# Patient Record
Sex: FEMALE | Race: BLACK OR AFRICAN AMERICAN | Employment: FULL TIME | URBAN - METROPOLITAN AREA
[De-identification: names, ages, dates, MRNs, and addresses within clinical notes are randomized per-mention and may not be internally consistent; named-entity substitution may affect disease eponyms.]

---

## 2017-08-16 ENCOUNTER — HOSPITAL ENCOUNTER (EMERGENCY)
Age: 32
Discharge: HOME | End: 2017-08-16
Attending: EMERGENCY MEDICINE
Payer: COMMERCIAL

## 2017-08-16 VITALS
DIASTOLIC BLOOD PRESSURE: 71 MMHG | RESPIRATION RATE: 18 BRPM | OXYGEN SATURATION: 100 % | SYSTOLIC BLOOD PRESSURE: 138 MMHG | HEART RATE: 96 BPM | WEIGHT: 220 LBS | HEIGHT: 62 IN | TEMPERATURE: 97.8 F | BODY MASS INDEX: 40.48 KG/M2

## 2017-08-16 DIAGNOSIS — L02.31 ABSCESS, GLUTEAL CLEFT: ICD-10-CM

## 2017-08-16 DIAGNOSIS — L05.01 PILONIDAL ABSCESS: Primary | ICD-10-CM

## 2017-08-16 PROCEDURE — 74011250637 HC RX REV CODE- 250/637: Performed by: EMERGENCY MEDICINE

## 2017-08-16 PROCEDURE — 99283 EMERGENCY DEPT VISIT LOW MDM: CPT | Performed by: EMERGENCY MEDICINE

## 2017-08-16 PROCEDURE — 96372 THER/PROPH/DIAG INJ SC/IM: CPT | Performed by: EMERGENCY MEDICINE

## 2017-08-16 PROCEDURE — 87205 SMEAR GRAM STAIN: CPT | Performed by: EMERGENCY MEDICINE

## 2017-08-16 PROCEDURE — 77030019895 HC PCKNG STRP IODO -A

## 2017-08-16 PROCEDURE — 75810000289 HC I&D ABSCESS SIMP/COMP/MULT: Performed by: EMERGENCY MEDICINE

## 2017-08-16 PROCEDURE — 74011250636 HC RX REV CODE- 250/636: Performed by: EMERGENCY MEDICINE

## 2017-08-16 PROCEDURE — 87077 CULTURE AEROBIC IDENTIFY: CPT | Performed by: EMERGENCY MEDICINE

## 2017-08-16 PROCEDURE — 87186 SC STD MICRODIL/AGAR DIL: CPT | Performed by: EMERGENCY MEDICINE

## 2017-08-16 RX ORDER — SULFAMETHOXAZOLE AND TRIMETHOPRIM 800; 160 MG/1; MG/1
1 TABLET ORAL 2 TIMES DAILY
Qty: 20 TAB | Refills: 0 | Status: SHIPPED | OUTPATIENT
Start: 2017-08-16 | End: 2017-08-26

## 2017-08-16 RX ORDER — ONDANSETRON 8 MG/1
8 TABLET, ORALLY DISINTEGRATING ORAL
Status: COMPLETED | OUTPATIENT
Start: 2017-08-16 | End: 2017-08-16

## 2017-08-16 RX ORDER — CHLORHEXIDINE GLUCONATE 4 G/100ML
SOLUTION TOPICAL
Qty: 473 ML | Refills: 0 | Status: SHIPPED | OUTPATIENT
Start: 2017-08-16

## 2017-08-16 RX ORDER — MUPIROCIN 20 MG/G
OINTMENT TOPICAL
Qty: 22 G | Refills: 0 | Status: SHIPPED | OUTPATIENT
Start: 2017-08-16

## 2017-08-16 RX ORDER — MORPHINE SULFATE 4 MG/ML
4 INJECTION, SOLUTION INTRAMUSCULAR; INTRAVENOUS
Status: COMPLETED | OUTPATIENT
Start: 2017-08-16 | End: 2017-08-16

## 2017-08-16 RX ORDER — MORPHINE SULFATE 15 MG/1
15 TABLET ORAL
Qty: 12 TAB | Refills: 0 | Status: SHIPPED | OUTPATIENT
Start: 2017-08-16 | End: 2019-01-21 | Stop reason: ALTCHOICE

## 2017-08-16 RX ORDER — IBUPROFEN 800 MG/1
800 TABLET ORAL
Status: COMPLETED | OUTPATIENT
Start: 2017-08-16 | End: 2017-08-16

## 2017-08-16 RX ADMIN — IBUPROFEN 800 MG: 800 TABLET ORAL at 22:37

## 2017-08-16 RX ADMIN — MORPHINE SULFATE 4 MG: 4 INJECTION, SOLUTION INTRAMUSCULAR; INTRAVENOUS at 22:37

## 2017-08-16 RX ADMIN — ONDANSETRON 8 MG: 8 TABLET, ORALLY DISINTEGRATING ORAL at 22:37

## 2017-08-16 NOTE — LETTER
[facility] EMERGENCY DEPT 
[address] [place] [address] [place] [address] 
[phone] Work/School Note Date: 8/16/2017 To Whom It May concern: 
 
Aspen Daily was seen and treated today in the emergency room by the following provider(s): 
Attending Provider: David Huggins MD.   
 
Aspen Daily may return to work on 8-18-17. Sincerely, Nadia Barnhart RN

## 2017-08-17 NOTE — DISCHARGE INSTRUCTIONS
Pilonidal Abscess: Care Instructions  Your Care Instructions    A pilonidal abscess is an infection caused by an ingrown hair. The abscess occurs in the area of the tailbone and the top of the buttocks. The infection causes a pocket of pus to form. It can be quite painful. Your doctor may have opened and drained the abscess. You can take care of yourself at home to help the area heal. In some cases, the abscess returns. Your doctor may suggest surgery to remove the site of the infection if it comes back. You may have had a sedative to help you relax. You may be unsteady after having sedation. It can take a few hours for the medicine's effects to wear off. Common side effects of sedation include nausea, vomiting, and feeling sleepy or tired. The doctor has checked you carefully, but problems can develop later. If you notice any problems or new symptoms, get medical treatment right away. Follow-up care is a key part of your treatment and safety. Be sure to make and go to all appointments, and call your doctor if you are having problems. It's also a good idea to know your test results and keep a list of the medicines you take. How can you care for yourself at home? · If the doctor gave you a sedative:  ¨ For 24 hours, don't do anything that requires attention to detail. It takes time for the medicine's effects to completely wear off. ¨ For your safety, do not drive or operate any machinery that could be dangerous. Wait until the medicine wears off and you can think clearly and react easily. · If your doctor prescribed antibiotics, take them exactly as directed. Do not stop taking them just because you feel better. You need to take the full course of antibiotics. · Be safe with medicines. Take pain medicines exactly as directed. ¨ If the doctor gave you a prescription medicine for pain, take it as prescribed.   ¨ If you are not taking a prescription pain medicine, ask your doctor if you can take an over-the-counter medicine. · If your doctor opened and drained your abscess, you may have gauze or other packing material inside your wound. Follow all instructions from your doctor on how to care for your wound. · Keep the area of your wound very clean. Use wet cotton balls, a warm washcloth, or baby wipes. Clean the area gently, especially after a bowel movement. When should you call for help? Call 911 anytime you think you may need emergency care. For example, call if:  · You have trouble breathing. · You passed out (lost consciousness). Call your doctor now or seek immediate medical care if:  · You have new or worse nausea or vomiting. · You have symptoms of infection, such as:  ¨ Increased pain, swelling, warmth, or redness. ¨ Red streaks leading from the area. ¨ Pus draining from the area. ¨ A fever. Watch closely for changes in your health, and be sure to contact your doctor if:  · You do not get better as expected. Where can you learn more? Go to http://lizet-claudia.info/. Enter 22 [id number] in the search box to learn more about \"Pilonidal Abscess: Care Instructions. \"  Current as of: October 13, 2016  Content Version: 11.3  © 3943-6280 [employer]. Care instructions adapted under license by [employer] (which disclaims liability or warranty for this information). If you have questions about a medical condition or this instruction, always ask your healthcare professional. [place] [address] any warranty or liability for your use of this information.

## 2017-08-17 NOTE — ED NOTES
Agree w/ triage note, draining abscess to upper mid buttock x 1 week w/ new encapsulation to L side x 2 days.

## 2017-08-17 NOTE — ED NOTES
I have reviewed discharge instructions with the patient. The patient verbalized understanding.   Pt ambulated to lobby unassisted and is accompanied by her SO.

## 2017-08-17 NOTE — ED PROVIDER NOTES
Patient is a 33 y.o. female presenting with abscess. The history is provided by the patient. Abscess    This is a new problem. The current episode started more than 2 days ago. The problem has been gradually worsening. There has been no fever. Affected Location: lower back upper buttocks in the midline and left  butt cheek. The pain is moderate. The pain has been constant since onset. Associated symptoms include pain and weeping. History reviewed. No pertinent past medical history. History reviewed. No pertinent surgical history. History reviewed. No pertinent family history. Social History     Social History    Marital status: SINGLE     Spouse name: N/A    Number of children: N/A    Years of education: N/A     Occupational History    Not on file. Social History Main Topics    Smoking status: Current Every Day Smoker     Packs/day: 1.00    Smokeless tobacco: Never Used    Alcohol use Yes      Comment: occasional    Drug use: No    Sexual activity: Yes     Partners: Male     Birth control/ protection: None     Other Topics Concern    Not on file     Social History Narrative         ALLERGIES: Review of patient's allergies indicates no known allergies. Review of Systems   Constitutional: Negative for fever. Gastrointestinal: Negative for nausea and vomiting. Skin: Negative for rash. Vitals:    08/16/17 1836   BP: 120/62   Pulse: 100   Resp: 16   Temp: 98 °F (36.7 °C)   SpO2: 100%   Weight: 99.8 kg (220 lb)   Height: 5' 2\" (1.575 m)            Physical Exam   Constitutional: She appears well-developed and well-nourished. Cardiovascular: Normal rate, regular rhythm, normal heart sounds and intact distal pulses. Pulmonary/Chest: Effort normal and breath sounds normal.   Abdominal: Soft. She exhibits no distension. There is no tenderness. Skin: Skin is warm and dry.    4-5 cm diameter fluctuant and partially draining pilonidal cyst just right of midline at the superior gluteal cleft. 3-4 cm area of induration and fluctuance just left of midline on the left buttock several centimeters away from the anus. Both areas are tender to palpation. No obvious overlying or significant cellulitis noted. Nursing note and vitals reviewed. MDM  Number of Diagnoses or Management Options  Diagnosis management comments: Patient has a pilonidal cyst and then a gluteal abscess that is close to being perianal but I do not believe it is. 2 separate incision and drainages done. Both were probed and deloculated and packed with quarter-inch Nu Gauze. Wound cultures of each obtained. Vision is a history of abscesses in the past so will be provided Bactroban nasal ointment and Hibiclens soap to help eradicate her body and staph. We'll refer her to surgery for wound check and due to the pilonidal abscess and concern for recurrence. She may need wider excision at a later date. Amount and/or Complexity of Data Reviewed  Clinical lab tests: ordered      ED Course       I&D Marylin Complex  Date/Time: 8/16/2017 10:33 PM  Performed by: Domenic Peguero  Authorized by: Domenic Peguero     Consent:     Consent obtained:  Verbal    Consent given by:  Patient    Risks discussed:  Bleeding and pain    Alternatives discussed:  No treatment  Location:     Type:  Pilonidal cyst    Size:  4    Location: low back, uipper right buttock. Pre-procedure details:     Skin preparation:  Betadine  Anesthesia (see MAR for exact dosages): Anesthesia method:  Local infiltration  Procedure type:     Complexity:  Complex  Procedure details:     Needle aspiration: no      Incision types:  Single straight    Incision depth:  Subcutaneous    Scalpel blade:  11    Wound management:  Probed and deloculated    Drainage:  Bloody and purulent    Drainage amount:  Copious    Wound treatment:  Wound left open    Packing materials:  1/4 in gauze  Post-procedure details:     Patient tolerance of procedure:   Tolerated well, no immediate complications  I&D Abcess Complex  Date/Time: 8/16/2017 10:34 PM  Performed by: Breann Saravia  Authorized by: Breann Saravia     Consent:     Consent obtained:  Verbal    Consent given by:  Patient    Risks discussed:  Bleeding and pain  Location:     Type:  Abscess    Location: left buttock close to midline. Pre-procedure details:     Skin preparation:  Betadine  Anesthesia (see MAR for exact dosages): Anesthesia method:  Local infiltration  Procedure type:     Complexity:  Complex  Procedure details:     Needle aspiration: no      Incision types:  Single straight    Incision depth:  Subcutaneous    Scalpel blade:  11    Wound management:  Probed and deloculated    Drainage:  Purulent and bloody    Drainage amount:  Copious    Wound treatment:  Wound left open    Packing materials:  1/4 in gauze  Post-procedure details:     Patient tolerance of procedure:   Tolerated well, no immediate complications

## 2017-08-19 LAB
BACTERIA SPEC CULT: ABNORMAL
BACTERIA SPEC CULT: ABNORMAL
GRAM STN SPEC: ABNORMAL
SERVICE CMNT-IMP: ABNORMAL
SERVICE CMNT-IMP: ABNORMAL

## 2018-12-22 VITALS
RESPIRATION RATE: 18 BRPM | HEART RATE: 81 BPM | TEMPERATURE: 98.2 F | OXYGEN SATURATION: 100 % | DIASTOLIC BLOOD PRESSURE: 77 MMHG | SYSTOLIC BLOOD PRESSURE: 123 MMHG

## 2018-12-22 PROCEDURE — 99283 EMERGENCY DEPT VISIT LOW MDM: CPT

## 2018-12-22 PROCEDURE — 75810000289 HC I&D ABSCESS SIMP/COMP/MULT

## 2018-12-23 ENCOUNTER — HOSPITAL ENCOUNTER (EMERGENCY)
Age: 33
Discharge: HOME | End: 2018-12-23
Payer: SELF-PAY

## 2018-12-23 DIAGNOSIS — L02.411 ABSCESS OF RIGHT AXILLA: Primary | ICD-10-CM

## 2018-12-23 PROCEDURE — 77030019895 HC PCKNG STRP IODO -A

## 2018-12-23 PROCEDURE — 75810000289 HC I&D ABSCESS SIMP/COMP/MULT

## 2018-12-23 RX ORDER — SULFAMETHOXAZOLE AND TRIMETHOPRIM 800; 160 MG/1; MG/1
1 TABLET ORAL 2 TIMES DAILY
Qty: 14 TAB | Refills: 0 | Status: SHIPPED | OUTPATIENT
Start: 2018-12-23 | End: 2018-12-30

## 2018-12-23 RX ORDER — SULFAMETHOXAZOLE AND TRIMETHOPRIM 800; 160 MG/1; MG/1
1 TABLET ORAL 2 TIMES DAILY
Qty: 14 TAB | Refills: 0 | Status: SHIPPED | OUTPATIENT
Start: 2018-12-23 | End: 2018-12-23

## 2018-12-23 NOTE — ED NOTES
I have reviewed discharge instructions with the patient. The patient verbalized understanding. Patient left ED via Discharge Method: ambulatory to Home with self. Opportunity for questions and clarification provided. Patient given 1 scripts. To continue your aftercare when you leave the hospital, you may receive an automated call from our care team to check in on how you are doing. This is a free service and part of our promise to provide the best care and service to meet your aftercare needs.  If you have questions, or wish to unsubscribe from this service please call [phone]. Thank you for Choosing our [facility] Emergency Department.

## 2018-12-23 NOTE — DISCHARGE INSTRUCTIONS
Skin Abscess: Care Instructions  Your Care Instructions    A skin abscess is a bacterial infection that forms a pocket of pus. A boil is a kind of skin abscess. The doctor may have cut an opening in the abscess so that the pus can drain out. You may have gauze in the cut so that the abscess will stay open and keep draining. You may need antibiotics. You will need to follow up with your doctor to make sure the infection has gone away. The doctor has checked you carefully, but problems can develop later. If you notice any problems or new symptoms, get medical treatment right away. Follow-up care is a key part of your treatment and safety. Be sure to make and go to all appointments, and call your doctor if you are having problems. It's also a good idea to know your test results and keep a list of the medicines you take. How can you care for yourself at home? · Apply warm and dry compresses, a heating pad set on low, or a hot water bottle 3 or 4 times a day for pain. Keep a cloth between the heat source and your skin. · If your doctor prescribed antibiotics, take them as directed. Do not stop taking them just because you feel better. You need to take the full course of antibiotics. · Take pain medicines exactly as directed. ? If the doctor gave you a prescription medicine for pain, take it as prescribed. ? If you are not taking a prescription pain medicine, ask your doctor if you can take an over-the-counter medicine. · Keep your bandage clean and dry. Change the bandage whenever it gets wet or dirty, or at least one time a day. · If the abscess was packed with gauze:  ? Keep follow-up appointments to have the gauze changed or removed. If the doctor instructed you to remove the gauze, follow the instructions you were given for how to remove it. ? After the gauze is removed, soak the area in warm water for 15 to 20 minutes 2 times a day, until the wound closes. When should you call for help?   Call your doctor now or seek immediate medical care if:    · You have signs of worsening infection, such as:  ? Increased pain, swelling, warmth, or redness. ? Red streaks leading from the infected skin. ? Pus draining from the wound. ? A fever.    Watch closely for changes in your health, and be sure to contact your doctor if:    · You do not get better as expected. Where can you learn more? Go to http://lizet-claudia.info/. Enter R962 in the search box to learn more about \"Skin Abscess: Care Instructions. \"  Current as of: April 18, 2018  Content Version: 11.8  © 4030-6533 [employer]. Care instructions adapted under license by [employer] (which disclaims liability or warranty for this information). If you have questions about a medical condition or this instruction, always ask your healthcare professional. [address] any warranty or liability for your use of this information.

## 2018-12-23 NOTE — ED PROVIDER NOTES
52-year-old female with abscess to right axilla. The history is provided by the patient. Abscess    This is a new problem. The current episode started more than 2 days ago. The problem has been rapidly worsening. The problem is associated with nothing. The rash is present on the right arm. The pain is at a severity of 5/10. She has tried nothing for the symptoms. History reviewed. No pertinent past medical history. History reviewed. No pertinent surgical history. History reviewed. No pertinent family history. Social History     Socioeconomic History    Marital status: SINGLE     Spouse name: Not on file    Number of children: Not on file    Years of education: Not on file    Highest education level: Not on file   Social Needs    Financial resource strain: Not on file    Food insecurity - worry: Not on file    Food insecurity - inability: Not on file    Transportation needs - medical: Not on file   [email or web address] needs - non-medical: Not on file   Occupational History    Not on file   Tobacco Use    Smoking status: Current Every Day Smoker     Packs/day: 1.00    Smokeless tobacco: Never Used   Substance and Sexual Activity    Alcohol use: Yes     Comment: occasional    Drug use: No    Sexual activity: Yes     Partners: Male     Birth control/protection: None   Other Topics Concern    Not on file   Social History Narrative    Not on file         ALLERGIES: Patient has no known allergies. Review of Systems   Constitutional: Negative. Negative for activity change. HENT: Negative. Eyes: Negative. Respiratory: Negative. Cardiovascular: Negative. Gastrointestinal: Negative. Genitourinary: Negative. Musculoskeletal: Negative. Skin: Negative. Neurological: Negative. Psychiatric/Behavioral: Negative. All other systems reviewed and are negative.       Vitals:    12/22/18 2348   BP: 123/77   Pulse: 81   Resp: 18   Temp: 98.2 °F (36.8 °C)   SpO2: 100% Physical Exam   Constitutional: She is oriented to person, place, and time. She appears well-developed and well-nourished. No distress. HENT:   Head: Normocephalic and atraumatic. Right Ear: External ear normal.   Left Ear: External ear normal.   Nose: Nose normal.   Eyes: Conjunctivae and EOM are normal. Pupils are equal, round, and reactive to light. Right eye exhibits no discharge. Left eye exhibits no discharge. No scleral icterus. Neck: Normal range of motion. Cardiovascular: Regular rhythm. Pulmonary/Chest: Effort normal and breath sounds normal. No stridor. No respiratory distress. She has no wheezes. She has no rales. Abdominal: Soft. Bowel sounds are normal. She exhibits no distension. There is no tenderness. Musculoskeletal: Normal range of motion. Right upper arm: She exhibits tenderness. Arms:  Neurological: She is alert and oriented to person, place, and time. She exhibits normal muscle tone. Coordination normal.   Skin: Skin is warm and dry. No rash noted. Psychiatric: She has a normal mood and affect. Her behavior is normal.        MDM  Number of Diagnoses or Management Options  Diagnosis management comments: Axillary abscess recurrent    Plan antibiotics I&D         I&D Abcess Simple  Date/Time: 12/23/2018 1:27 AM  Performed by: Luisa Lacy MD  Authorized by: Luisa Lacy MD     Consent:     Consent obtained:  Verbal    Consent given by:  Patient    Risks discussed:  Bleeding, incomplete drainage, pain, infection and damage to other organs    Alternatives discussed:  No treatment, delayed treatment and alternative treatment  Location:     Type:  Abscess    Location:  Upper extremity    Upper extremity location: axilla right. Pre-procedure details:     Skin preparation:  Betadine  Anesthesia (see MAR for exact dosages):      Anesthesia method:  Local infiltration    Local anesthetic:  Lidocaine 1% w/o epi  Procedure type:     Complexity: Simple  Procedure details:     Needle aspiration: no      Incision types:  Stab incision    Scalpel blade:  11    Wound management:  Probed and deloculated    Drainage:  Purulent    Drainage amount:  Copious    Wound treatment:  Drain placed    Packing materials:  1/2 in iodoform gauze  Post-procedure details:     Patient tolerance of procedure:   Tolerated well, no immediate complications

## 2019-01-20 PROCEDURE — 99283 EMERGENCY DEPT VISIT LOW MDM: CPT | Performed by: EMERGENCY MEDICINE

## 2019-01-20 PROCEDURE — 75810000289 HC I&D ABSCESS SIMP/COMP/MULT: Performed by: EMERGENCY MEDICINE

## 2019-01-21 ENCOUNTER — HOSPITAL ENCOUNTER (EMERGENCY)
Age: 34
Discharge: HOME | End: 2019-01-21
Attending: EMERGENCY MEDICINE
Payer: SELF-PAY

## 2019-01-21 VITALS
OXYGEN SATURATION: 99 % | WEIGHT: 210 LBS | TEMPERATURE: 98.6 F | HEART RATE: 80 BPM | RESPIRATION RATE: 16 BRPM | BODY MASS INDEX: 37.21 KG/M2 | SYSTOLIC BLOOD PRESSURE: 122 MMHG | DIASTOLIC BLOOD PRESSURE: 74 MMHG | HEIGHT: 63 IN

## 2019-01-21 DIAGNOSIS — L02.419 ABSCESS, AXILLA: Primary | ICD-10-CM

## 2019-01-21 PROCEDURE — 75810000289 HC I&D ABSCESS SIMP/COMP/MULT: Performed by: EMERGENCY MEDICINE

## 2019-01-21 RX ORDER — TRAMADOL HYDROCHLORIDE 50 MG/1
50 TABLET ORAL
Qty: 15 TAB | Refills: 0 | Status: SHIPPED | OUTPATIENT
Start: 2019-01-21

## 2019-01-21 RX ORDER — CLINDAMYCIN HYDROCHLORIDE 150 MG/1
300 CAPSULE ORAL EVERY 6 HOURS
Qty: 56 CAP | Refills: 0 | Status: SHIPPED | OUTPATIENT
Start: 2019-01-21 | End: 2019-01-28

## 2019-01-21 RX ORDER — IBUPROFEN 800 MG/1
800 TABLET ORAL
Qty: 20 TAB | Refills: 0 | Status: SHIPPED | OUTPATIENT
Start: 2019-01-21 | End: 2019-01-28

## 2019-01-21 NOTE — DISCHARGE INSTRUCTIONS
Patient Education        Skin Abscess: Care Instructions  Your Care Instructions    A skin abscess is a bacterial infection that forms a pocket of pus. A boil is a kind of skin abscess. The doctor may have cut an opening in the abscess so that the pus can drain out. You may have gauze in the cut so that the abscess will stay open and keep draining. You may need antibiotics. You will need to follow up with your doctor to make sure the infection has gone away. The doctor has checked you carefully, but problems can develop later. If you notice any problems or new symptoms, get medical treatment right away. Follow-up care is a key part of your treatment and safety. Be sure to make and go to all appointments, and call your doctor if you are having problems. It's also a good idea to know your test results and keep a list of the medicines you take. How can you care for yourself at home? · Apply warm and dry compresses, a heating pad set on low, or a hot water bottle 3 or 4 times a day for pain. Keep a cloth between the heat source and your skin. · If your doctor prescribed antibiotics, take them as directed. Do not stop taking them just because you feel better. You need to take the full course of antibiotics. · Take pain medicines exactly as directed. ? If the doctor gave you a prescription medicine for pain, take it as prescribed. ? If you are not taking a prescription pain medicine, ask your doctor if you can take an over-the-counter medicine. · Keep your bandage clean and dry. Change the bandage whenever it gets wet or dirty, or at least one time a day. · If the abscess was packed with gauze:  ? Keep follow-up appointments to have the gauze changed or removed. If the doctor instructed you to remove the gauze, follow the instructions you were given for how to remove it. ? After the gauze is removed, soak the area in warm water for 15 to 20 minutes 2 times a day, until the wound closes.   When should you call for help? Call your doctor now or seek immediate medical care if:    · You have signs of worsening infection, such as:  ? Increased pain, swelling, warmth, or redness. ? Red streaks leading from the infected skin. ? Pus draining from the wound. ? A fever.    Watch closely for changes in your health, and be sure to contact your doctor if:    · You do not get better as expected. Where can you learn more? Go to http://lizet-claudia.info/. Enter C310 in the search box to learn more about \"Skin Abscess: Care Instructions. \"  Current as of: April 17, 2018  Content Version: 11.9  © 3062-2015 [employer]. Care instructions adapted under license by [employer] (which disclaims liability or warranty for this information). If you have questions about a medical condition or this instruction, always ask your healthcare professional. [place] [address] any warranty or liability for your use of this information. Patient Education        Hidradenitis Suppurativa: Care Instructions  Your Care Instructions    Hidradenitis suppurativa (say \"zmn-dzwd-gs-NY-tus sup-yur-uh-TY-vuh\") is a skin condition that causes lumps on the skin that look like pimples or boils. The lumps are usually painful and can break open and drain blood and bad-smelling pus. The condition can come and go for many years. Treatment for this condition may include antibiotics and other medicines. You may need surgery to remove the lumps. Home care includes wearing loose-fitting clothes and washing the area gently. You can help prevent lumps from coming back by staying at a healthy weight and not smoking. Doctors don't know exactly how this condition starts. But they do know that something irritates and inflames the hair follicles, causing them to swell and form lumps. This skin condition can't be spread from person to person (isn't contagious).   Follow-up care is a key part of your treatment and safety. Be sure to make and go to all appointments, and call your doctor if you are having problems. It's also a good idea to know your test results and keep a list of the medicines you take. How can you care for yourself at home?  [facility] care    · Wash the area every day with mild soap. Use your hands rather than a washcloth or sponge when you wash that part of your body.     · Leave the affected areas uncovered when you can. If you have lumps that are draining, you can cover them with a bandage or other dressing. Put petroleum jelly (such as Vaseline) on the dressing to help keep it from sticking.     · Wear-loose fitting clothes that don't rub against the area. Avoid activities that cause skin to rub together.     · If you have pain, try a warm compress. Soak a towel or washcloth in warm water, wring it out, and place it on the affected skin for about 10 minutes. Medicines    · Be safe with medicines. Take your medicines exactly as prescribed. Call your doctor if you think you are having a problem with your medicine. You will get more details on the specific medicines your doctor prescribes.     · If your doctor prescribed antibiotics, take them as directed. Do not stop taking them just because you feel better. You need to take the full course of antibiotics.    Lifestyle choices    · If you smoke, think about quitting. Smoking can make the condition worse. If you need help quitting, talk to your doctor about stop-smoking programs and medicines. These can increase your chances of quitting for good.     · Stay at a healthy weight, or lose weight, by eating healthy foods and being physically active. Being overweight could make this condition worse. When should you call for help? Call your doctor now or seek immediate medical care if:    · You have symptoms of infection, such as:  ? Increased pain, swelling, warmth, or redness. ? Red streaks leading from the area. ? Pus draining from the area. ? A fever.  Watch closely for changes in your health, and be sure to contact your doctor if:    · You do not get better as expected. Where can you learn more? Go to http://lizet-claudia.info/. Enter K787 in the search box to learn more about \"Hidradenitis Suppurativa: Care Instructions. \"  Current as of: April 17, 2018  Content Version: 11.9  © 7471-7154 [email or web address]. Care instructions adapted under license by "[employer] (which disclaims liability or warranty for this information). If you have questions about a medical condition or this instruction, always ask your healthcare professional. [address] any warranty or liability for your use of this information.

## 2019-01-21 NOTE — ED NOTES
I have reviewed discharge instructions with the patient. The patient verbalized understanding. Patient left ED via Discharge Method: ambulatory to Home with self Opportunity for questions and clarification provided. Pt was told to follow up with Dr. Jd Ray for surgery and given the name and number and address of this MD.  
 
 
Patient given 3 scripts. To continue your aftercare when you leave the hospital, you may receive an automated call from our care team to check in on how you are doing. This is a free service and part of our promise to provide the best care and service to meet your aftercare needs.  If you have questions, or wish to unsubscribe from this service please call [phone]. Thank you for Choosing our [facility] Emergency Department.

## 2019-01-21 NOTE — ED TRIAGE NOTES
C/o abscess to right axilla. States seen at es approx 3 weeks pta, states incision and drainage at that time however not getting any better. Swelling noted to site. Denies fever. States completed bactrim

## 2019-01-21 NOTE — ED PROVIDER NOTES
Presents with complaints of abscess in the right axilla. Patient has had I&Din the past.  She completed a course of Bactrim. The history is provided by the patient. Abscess This is a new problem. The current episode started more than 1 week ago. The problem has been gradually worsening. There has been no fever. The rash is present on the right arm. The pain is moderate. The pain has been constant since onset. She has tried antibiotic for the symptoms. The treatment provided no relief. No past medical history on file. No past surgical history on file. No family history on file. Social History Socioeconomic History  Marital status: SINGLE Spouse name: Not on file  Number of children: Not on file  Years of education: Not on file  Highest education level: Not on file Social Needs  Financial resource strain: Not on file  Food insecurity - worry: Not on file  Food insecurity - inability: Not on file  Transportation needs - medical: Not on file  Transportation needs - non-medical: Not on file Occupational History  Not on file Tobacco Use  Smoking status: Current Every Day Smoker Packs/day: 1.00  Smokeless tobacco: Never Used Substance and Sexual Activity  Alcohol use: Yes Comment: occasional  
 Drug use: No  
 Sexual activity: Yes  
  Partners: Male Birth control/protection: None Other Topics Concern  Not on file Social History Narrative  Not on file ALLERGIES: Patient has no known allergies. Review of Systems Constitutional: Negative for chills and fever. All other systems reviewed and are negative. Vitals:  
 01/21/19 0014 BP: 126/86 Pulse: 92 Resp: 18 Temp: 98.6 °F (37 °C) SpO2: 98% Weight: 95.3 kg (210 lb) Height: 5' 3\" (1.6 m) Physical Exam  
Constitutional: She is oriented to person, place, and time. She appears well-developed and well-nourished. No distress. HENT:  
Head: Normocephalic and atraumatic. Musculoskeletal: Normal range of motion. Right axilla with 2 areas of fluctuance. Large amount of induration no erythema cellulitis Neurological: She is alert and oriented to person, place, and time. Skin: Skin is warm and dry. She is not diaphoretic. Psychiatric: She has a normal mood and affect. Her behavior is normal.  
  
 
MDM Number of Diagnoses or Management Options Abscess, axilla:  
Diagnosis management comments: I reviewed her chart she is the emergency department multiple times for groin and axillary abscesses. She likely has hidradenitis suppurativa. Fluid initially yellow clear then purulent on I&D Amount and/or Complexity of Data Reviewed Review and summarize past medical records: yes Risk of Complications, Morbidity, and/or Mortality Presenting problems: moderate Diagnostic procedures: moderate Management options: moderate Patient Progress Patient progress: stable I&D Abcess Simple Date/Time: 1/21/2019 1:19 AM 
Performed by: Mar Schaumann, MD 
Authorized by: Mar Schaumann, MD  
 
Consent:  
  Consent obtained:  Verbal 
  Consent given by:  Patient Risks discussed:  Bleeding, infection, pain and incomplete drainage Alternatives discussed:  No treatment Location:  
  Type:  Abscess Size:  3 cm Location: axilla. Pre-procedure details:  
  Skin preparation:  Betadine Anesthesia (see MAR for exact dosages): Anesthesia method:  Local infiltration Local anesthetic:  Lidocaine 1% WITH epi Procedure type:  
  Complexity:  Simple Procedure details:  
  Incision types:  Single straight Incision depth:  Subcutaneous Scalpel blade:  11 Wound management:  Probed and deloculated Drainage:  Purulent and serous Drainage amount:  Copious Wound treatment:  Wound left open Packing materials:  None Post-procedure details:  
  Patient tolerance of procedure:   Tolerated well, no immediate complications

## 2019-02-14 ENCOUNTER — HOSPITAL ENCOUNTER (EMERGENCY)
Age: 34
Discharge: HOME | End: 2019-02-14
Attending: EMERGENCY MEDICINE
Payer: SELF-PAY

## 2019-02-14 VITALS
OXYGEN SATURATION: 100 % | DIASTOLIC BLOOD PRESSURE: 78 MMHG | SYSTOLIC BLOOD PRESSURE: 118 MMHG | WEIGHT: 220 LBS | HEIGHT: 62 IN | RESPIRATION RATE: 16 BRPM | BODY MASS INDEX: 40.48 KG/M2 | TEMPERATURE: 98.5 F | HEART RATE: 88 BPM

## 2019-02-14 DIAGNOSIS — J06.9 UPPER RESPIRATORY TRACT INFECTION, UNSPECIFIED TYPE: Primary | ICD-10-CM

## 2019-02-14 LAB
FLUAV AG NPH QL IA: NEGATIVE
FLUBV AG NPH QL IA: NEGATIVE
SPECIMEN SOURCE: NORMAL

## 2019-02-14 PROCEDURE — 87804 INFLUENZA ASSAY W/OPTIC: CPT

## 2019-02-14 PROCEDURE — 99283 EMERGENCY DEPT VISIT LOW MDM: CPT | Performed by: PHYSICIAN ASSISTANT

## 2019-02-14 RX ORDER — CEPHALEXIN 500 MG/1
500 CAPSULE ORAL 4 TIMES DAILY
Qty: 28 CAP | Refills: 0 | Status: SHIPPED | OUTPATIENT
Start: 2019-02-14 | End: 2019-02-21

## 2019-02-14 NOTE — ED PROVIDER NOTES
Pt with congestion, cough body aches for past week, episode of vomiting yesterday, little relief with otc meds The history is provided by the patient. Flu This is a new problem. Episode onset: 1 week. The problem occurs constantly. The problem has not changed since onset. The cough is productive of sputum. Patient reports a subjective fever - was not measured. The fever has been present for 3 - 4 days. Associated symptoms include chills, ear congestion, rhinorrhea, myalgias and vomiting. Pertinent negatives include no sore throat and no wheezing. She has tried decongestants for the symptoms. The treatment provided mild relief. She is a smoker. Her past medical history does not include pneumonia or asthma. History reviewed. No pertinent past medical history. History reviewed. No pertinent surgical history. History reviewed. No pertinent family history. Social History Socioeconomic History  Marital status: SINGLE Spouse name: Not on file  Number of children: Not on file  Years of education: Not on file  Highest education level: Not on file Social Needs  Financial resource strain: Not on file  Food insecurity - worry: Not on file  Food insecurity - inability: Not on file  Transportation needs - medical: Not on file  Transportation needs - non-medical: Not on file Occupational History  Not on file Tobacco Use  Smoking status: Current Every Day Smoker Packs/day: 1.00  Smokeless tobacco: Never Used Substance and Sexual Activity  Alcohol use: Yes Comment: occasional  
 Drug use: No  
 Sexual activity: Yes  
  Partners: Male Birth control/protection: None Other Topics Concern  Not on file Social History Narrative  Not on file ALLERGIES: Patient has no known allergies. Review of Systems Constitutional: Positive for chills. HENT: Positive for rhinorrhea. Negative for sore throat. Respiratory: Negative for wheezing. Gastrointestinal: Positive for vomiting. Musculoskeletal: Positive for myalgias. All other systems reviewed and are negative. Vitals:  
 02/14/19 1627 02/14/19 1634 BP: 146/89 Pulse: 76 Resp: 18 Temp: 98.5 °F (36.9 °C) SpO2: 100% 100% Weight: 99.8 kg (220 lb) Height: 5' 2\" (1.575 m) Physical Exam  
Constitutional: She is oriented to person, place, and time. She appears well-developed and well-nourished. No distress. HENT:  
Head: Normocephalic and atraumatic. Right Ear: External ear normal.  
Left Ear: External ear normal.  
Mouth/Throat: Oropharynx is clear and moist.  
Nasal congestion, tms and throat clear Eyes: EOM are normal. Pupils are equal, round, and reactive to light. Neck: Normal range of motion. Neck supple. Cardiovascular: Normal rate and regular rhythm. Pulmonary/Chest: Effort normal and breath sounds normal.  
Abdominal: Soft. Bowel sounds are normal. She exhibits no mass. There is no rebound and no guarding. Musculoskeletal: Normal range of motion. She exhibits no edema or deformity. Neurological: She is alert and oriented to person, place, and time. She displays normal reflexes. She exhibits normal muscle tone. Coordination normal.  
Skin: Skin is warm. She is not diaphoretic. Psychiatric: She has a normal mood and affect. Nursing note and vitals reviewed. MDM Number of Diagnoses or Management Options Diagnosis management comments: Flu - Will treat uri with keflex, work note given Amount and/or Complexity of Data Reviewed Clinical lab tests: ordered and reviewed Review and summarize past medical records: yes Risk of Complications, Morbidity, and/or Mortality Presenting problems: low Diagnostic procedures: low Management options: low Patient Progress Patient progress: improved Procedures

## 2019-02-14 NOTE — ED NOTES
I have reviewed discharge instructions with the patient. The patient verbalized understanding. Patient left ED via Discharge Method: ambulatory to Home with (insert name of family/friend, self, transportfriend). Opportunity for questions and clarification provided. Patient given 0 scripts. To continue your aftercare when you leave the hospital, you may receive an automated call from our care team to check in on how you are doing. This is a free service and part of our promise to provide the best care and service to meet your aftercare needs.  If you have questions, or wish to unsubscribe from this service please call [phone]. Thank you for Choosing our [address] [place] [address] Emergency Department.

## 2019-02-14 NOTE — LETTER
[address] [facility] EMERGENCY DEPT [address] [address] 
[phone] Work/School Note Date: 2/14/2019 To Whom It May concern: 
 
Lindsay Uribe was seen and treated today in the emergency room by the following provider(s): 
Attending Provider: Heather Carey MD 
Physician Assistant: IBIS Ríos. Lindsay Uribe may return to work on 2-15-19. Sincerely, IBIS Antunez

## 2019-07-31 ENCOUNTER — HOSPITAL ENCOUNTER (EMERGENCY)
Age: 34
Discharge: HOME | End: 2019-07-31
Attending: EMERGENCY MEDICINE
Payer: SELF-PAY

## 2019-07-31 VITALS
HEIGHT: 62 IN | WEIGHT: 220 LBS | OXYGEN SATURATION: 98 % | DIASTOLIC BLOOD PRESSURE: 81 MMHG | HEART RATE: 87 BPM | TEMPERATURE: 98.4 F | SYSTOLIC BLOOD PRESSURE: 116 MMHG | RESPIRATION RATE: 16 BRPM | BODY MASS INDEX: 40.48 KG/M2

## 2019-07-31 DIAGNOSIS — L02.419 AXILLARY ABSCESS: Primary | ICD-10-CM

## 2019-07-31 PROCEDURE — 99283 EMERGENCY DEPT VISIT LOW MDM: CPT | Performed by: EMERGENCY MEDICINE

## 2019-07-31 RX ORDER — SULFAMETHOXAZOLE AND TRIMETHOPRIM 800; 160 MG/1; MG/1
1 TABLET ORAL 2 TIMES DAILY
Qty: 20 TAB | Refills: 0 | Status: SHIPPED | OUTPATIENT
Start: 2019-07-31 | End: 2019-08-10

## 2019-07-31 RX ORDER — HYDROCODONE BITARTRATE AND ACETAMINOPHEN 7.5; 325 MG/1; MG/1
1 TABLET ORAL
Qty: 12 TAB | Refills: 0 | Status: SHIPPED | OUTPATIENT
Start: 2019-07-31 | End: 2019-08-03

## 2019-07-31 NOTE — ED TRIAGE NOTES
Pt arrived from home alone with c/o abscess under left axillary x 1 week. Pt has been taking OTC NSAIDS and has kept the area covered. No other c/o pain or discomfort, no other medical issues.

## 2019-08-01 NOTE — DISCHARGE INSTRUCTIONS

## 2019-08-01 NOTE — ED PROVIDER NOTES
The history is provided by the patient. Skin Problem    This is a new problem. The current episode started more than 2 days ago. The problem has been gradually worsening. There has been no fever. Affected Location: Left axillary region. The pain is at a severity of 9/10. The pain has been constant since onset. Associated symptoms include pain and weeping. Pertinent negatives include no blisters, no itching and no hives. She has tried nothing for the symptoms. The treatment provided no relief. No past medical history on file. No past surgical history on file. No family history on file.     Social History     Socioeconomic History    Marital status: SINGLE     Spouse name: Not on file    Number of children: Not on file    Years of education: Not on file    Highest education level: Not on file   Occupational History    Not on file   Social Needs    Financial resource strain: Not on file    Food insecurity:     Worry: Not on file     Inability: Not on file    Transportation needs:     Medical: Not on file     Non-medical: Not on file   Tobacco Use    Smoking status: Current Every Day Smoker     Packs/day: 1.00    Smokeless tobacco: Never Used   Substance and Sexual Activity    Alcohol use: Yes     Comment: occasional    Drug use: No    Sexual activity: Yes     Partners: Male     Birth control/protection: None   Lifestyle    Physical activity:     Days per week: Not on file     Minutes per session: Not on file    Stress: Not on file   Relationships    Social connections:     Talks on phone: Not on file     Gets together: Not on file     Attends Bahai service: Not on file     Active member of club or organization: Not on file     Attends meetings of clubs or organizations: Not on file     Relationship status: Not on file    Intimate partner violence:     Fear of current or ex partner: Not on file     Emotionally abused: Not on file     Physically abused: Not on file     Forced sexual activity: Not on file   Other Topics Concern    Not on file   Social History Narrative    Not on file         ALLERGIES: Patient has no known allergies. Review of Systems   Constitutional: Negative for chills and fever. Skin: Positive for color change and wound. Negative for itching, pallor and rash. All other systems reviewed and are negative. Vitals:    07/31/19 1906 07/31/19 2105   BP: 137/87 116/81   Pulse: (!) 106 87   Resp: 18 16   Temp: 98.4 °F (36.9 °C)    SpO2: 99% 98%   Weight: 99.8 kg (220 lb)    Height: 5' 2\" (1.575 m)             Physical Exam   Constitutional: She is oriented to person, place, and time. She appears well-developed and well-nourished. No distress. HENT:   Head: Normocephalic and atraumatic. Right Ear: External ear normal.   Left Ear: External ear normal.   Eyes: Pupils are equal, round, and reactive to light. Conjunctivae and EOM are normal.   Neck: Normal range of motion. Neck supple. Cardiovascular: Normal rate and regular rhythm. Exam reveals no friction rub. No murmur heard. Pulmonary/Chest: Effort normal and breath sounds normal.   Musculoskeletal: Normal range of motion. Neurological: She is alert and oriented to person, place, and time. Skin: Skin is warm and dry. Capillary refill takes less than 2 seconds. She is not diaphoretic. There is erythema (Left axilla with 3 x 4 cm indurated area with central skin breakdown and drainage of a small amount of pus. There is no fluctuance. ). Psychiatric: She has a normal mood and affect. Her behavior is normal.   Nursing note and vitals reviewed.        MDM  Number of Diagnoses or Management Options  Axillary abscess: new and does not require workup     Amount and/or Complexity of Data Reviewed  Review and summarize past medical records: yes    Risk of Complications, Morbidity, and/or Mortality  Presenting problems: low  Diagnostic procedures: minimal  Management options: low    Patient Progress  Patient progress: stable         Procedures    I offered to I&D the abscess to the left axillary region, the patient opted to take antibiotics and do warm compresses and reevaluate in 2 days if not significantly improved. Dictated using voice recognition software.  Proofread, but unrecognized errors may exist.

## 2019-08-02 ENCOUNTER — HOSPITAL ENCOUNTER (EMERGENCY)
Age: 34
Discharge: HOME | End: 2019-08-02
Attending: EMERGENCY MEDICINE
Payer: SELF-PAY

## 2019-08-02 VITALS
BODY MASS INDEX: 38.64 KG/M2 | RESPIRATION RATE: 18 BRPM | TEMPERATURE: 98.5 F | WEIGHT: 210 LBS | OXYGEN SATURATION: 98 % | HEIGHT: 62 IN | DIASTOLIC BLOOD PRESSURE: 87 MMHG | HEART RATE: 94 BPM | SYSTOLIC BLOOD PRESSURE: 132 MMHG

## 2019-08-02 DIAGNOSIS — L02.412 ABSCESS OF LEFT AXILLA: Primary | ICD-10-CM

## 2019-08-02 PROCEDURE — 75810000289 HC I&D ABSCESS SIMP/COMP/MULT: Performed by: EMERGENCY MEDICINE

## 2019-08-02 PROCEDURE — 99283 EMERGENCY DEPT VISIT LOW MDM: CPT | Performed by: EMERGENCY MEDICINE

## 2019-08-02 NOTE — DISCHARGE INSTRUCTIONS
Come back in the next 48-72 hours for wound check. Continue antibiotic. Warm compress to the area. Return if worsening symptoms.

## 2019-08-02 NOTE — ED PROVIDER NOTES
HPI:  36 female here for with wound check for the left axilla that was seen from 2 days ago. Taken Bactrim and doing warm compresses as instructed. Has got a little bit smaller however stated there is still drainage. No fever. No trouble breathing. No other rash.  + pain     ROS  Constitutional: No fever, no chills  Skin: no rash  Eye: No vision changes  ENMT: No sore throat  Respiratory: No shortness of breath, no cough  Cardiovascular: No chest pain, no palpitations  Gastrointestinal: No vomiting, no nausea, no diarrhea, no abdominal pain  : No dysuria  MSK: No back pain, no muscle pain, no joint pain  Neuro: No headache, no change in mental status, no numbness, no tingling, no weakness  Psych:   Endocrine:   All other review of systems positive per history of present illness and the above otherwise negative or noncontributory. Visit Vitals  /89 (BP 1 Location: Right arm, BP Patient Position: At rest)   Pulse (!) 102   Temp 98.7 °F (37.1 °C)   Resp 18   Ht 5' 2\" (1.575 m)   Wt 95.3 kg (210 lb)   SpO2 100%   BMI 38.41 kg/m²     No past medical history on file. No past surgical history on file. Prior to Admission Medications   Prescriptions Last Dose Informant Patient Reported? Taking? HYDROcodone-acetaminophen (NORCO) 7.5-325 mg per tablet   No No   Sig: Take 1 Tab by mouth every six (6) hours as needed for Pain for up to 3 days. Max Daily Amount: 4 Tabs. albuterol (PROVENTIL HFA, VENTOLIN HFA, PROAIR HFA) 90 mcg/actuation inhaler   No No   Sig: Take 2 Puffs by inhalation every six (6) hours as needed for Wheezing or Shortness of Breath. chlorhexidine (HIBICLENS) 4 % liquid   No No   Sig: Wash once daily, avoiding genitals and eyes and allow to stay on for 30 seconds then wash off.  1 week duration. mupirocin (BACTROBAN) 2 % ointment   No No   Sig: Apply to the inside of both nostrils twice daily for 7 days.    traMADol (ULTRAM) 50 mg tablet   No No   Sig: Take 1 Tab by mouth every six (6) hours as needed for Pain. Max Daily Amount: 200 mg.   trimethoprim-sulfamethoxazole (BACTRIM DS) 160-800 mg per tablet   No No   Sig: Take 1 Tab by mouth two (2) times a day for 10 days. Facility-Administered Medications: None         Adult Exam   General: alert, no acute distress  Head: normocephalic, atraumatic  ENT: moist mucous membranes  Neck: supple, non-tender; full range of motion  Cardiovascular:   Respiratory:    Gastrointestinal:   Back: non-tender, full range of motion  Musculoskeletal: normal range of motion, normal strength, no gross deformities  Left axilla with a large cellulitis with associated abscess measuring approximately 4 x 3 cm. Fluctuant noted. Neurological: alert and oriented x 4, no gross focal deficits; normal speech  Psychiatric: cooperative; appropriate mood and affect    MDM:  Spoke with the patient. Recommend incision and drainage of the wound due to concern for abscess. She is in agreement with the plan. Incision and drainage performed. She tolerated procedure well. Large amount of drainage noted. Patient is recommended to continue warm compress, Bactrim, pain medication as needed and return for wound check in the next 48-72 hour. Packing also place. I&D   Procedure Note - Incision and Drainage  Performed by: Isael Schneider MD  Immediately prior to the procedure, the patient was reevaluated and found suitable for the planned procedure and any planned medications. The identified area was prepped with chlorhexidine. Anesthesia was achieved with 2 mLs of 1% lidocaine with epi. The  Left axillaabscess was incised with a #11 blade and At least 10 mLs of purulent drainage was expressed. The wound was probed and any loculations were broken up. The abscess pocket was irrigated and packed with gauze. A dressing was applied. The procedure took 15 minutes. The patient tolerated the procedure well. Dragon voice recognition software was used to create this note.  Although the note has been reviewed and corrected where necessary, additional errors may have been overlooked and remain in the text.

## 2019-08-02 NOTE — LETTER
[address] [facility] EMERGENCY DEPT 
ONE  [address] [facility] 
[phone] Work/School Note Date: 8/2/2019 To Whom It May concern: 
 
Tamia Padron was seen and treated today in the emergency room by the following provider(s): 
Attending Provider: Bonny Fong MD.   
 
Tamia Padron may return to work on Monday August 5th 2019. Sincerely, Jasper Morley RN

## 2019-08-02 NOTE — ED TRIAGE NOTES
Arrives requesting note to return to work after seen here 7/31 for axillary abscess to left. States instructed to return to ED tomorrow for recheck however attempted to return to work, note to return requested by job. States placed on antibiotics, reports compliance with meds. Denies fevers.

## 2019-08-03 NOTE — ED NOTES
I have reviewed discharge instructions with the patient. The patient verbalized understanding. Patient to follow up with ER in 48-72 or sooner with any changes/concerns. Patient expresses understanding. Patient ambulatory from ED in [facility] and to continue ABX already on.

## 2019-08-06 ENCOUNTER — HOSPITAL ENCOUNTER (EMERGENCY)
Age: 34
Discharge: HOME | End: 2019-08-06
Attending: EMERGENCY MEDICINE
Payer: SELF-PAY

## 2019-08-06 VITALS
BODY MASS INDEX: 40.48 KG/M2 | WEIGHT: 220 LBS | SYSTOLIC BLOOD PRESSURE: 138 MMHG | HEIGHT: 62 IN | HEART RATE: 85 BPM | TEMPERATURE: 98 F | RESPIRATION RATE: 18 BRPM | DIASTOLIC BLOOD PRESSURE: 76 MMHG | OXYGEN SATURATION: 100 %

## 2019-08-06 DIAGNOSIS — L02.91 ABSCESS: Primary | ICD-10-CM

## 2019-08-06 PROCEDURE — 99283 EMERGENCY DEPT VISIT LOW MDM: CPT | Performed by: EMERGENCY MEDICINE

## 2019-08-06 NOTE — ED TRIAGE NOTES
Pt states she had a boil that was lanced and packed that she had done Friday. Here to have packing removed.

## 2019-08-06 NOTE — ED NOTES
Wound care provided for patient to left axilla, wet to dry dressing with abd pad and tegaderm placed per verbal order from dr wilson.  Patient tolerated well

## 2019-08-06 NOTE — ED PROVIDER NOTES
Aria Coates is a 36 y.o. female who presents to the ED with a chief complaint of wound check. She had abscess to the left axilla. She had packing placed with an I and D. Completed in the emergency department. She states the wound is feeling better she is taking the Bactrim and believes the packing may have already came out. History reviewed. No pertinent past medical history. History reviewed. No pertinent surgical history. History reviewed. No pertinent family history.     Social History     Socioeconomic History    Marital status: SINGLE     Spouse name: Not on file    Number of children: Not on file    Years of education: Not on file    Highest education level: Not on file   Occupational History    Not on file   Social Needs    Financial resource strain: Not on file    Food insecurity:     Worry: Not on file     Inability: Not on file    Transportation needs:     Medical: Not on file     Non-medical: Not on file   Tobacco Use    Smoking status: Current Every Day Smoker     Packs/day: 1.00    Smokeless tobacco: Never Used   Substance and Sexual Activity    Alcohol use: Yes     Comment: occasional    Drug use: No    Sexual activity: Yes     Partners: Male     Birth control/protection: None   Lifestyle    Physical activity:     Days per week: Not on file     Minutes per session: Not on file    Stress: Not on file   Relationships    Social connections:     Talks on phone: Not on file     Gets together: Not on file     Attends Advent service: Not on file     Active member of club or organization: Not on file     Attends meetings of clubs or organizations: Not on file     Relationship status: Not on file    Intimate partner violence:     Fear of current or ex partner: Not on file     Emotionally abused: Not on file     Physically abused: Not on file     Forced sexual activity: Not on file   Other Topics Concern    Not on file   Social History Narrative    Not on file ALLERGIES: Patient has no known allergies. Review of Systems   Constitutional: Negative for chills and fever. Respiratory: Negative for chest tightness and shortness of breath. Cardiovascular: Negative for chest pain and palpitations. Gastrointestinal: Negative for abdominal pain, diarrhea, nausea and vomiting. Skin: Positive for wound. Negative for pallor and rash. All other systems reviewed and are negative. Vitals:    08/06/19 1554   BP: (!) 150/94   Pulse: 93   Resp: 18   Temp: 98.1 °F (36.7 °C)   SpO2: 100%   Weight: 99.8 kg (220 lb)   Height: 5' 2\" (1.575 m)            Physical Exam   Constitutional: She is oriented to person, place, and time. She appears well-developed and well-nourished. No distress. HENT:   Head: Normocephalic and atraumatic. Eyes: Conjunctivae are normal. No scleral icterus. Neck: Normal range of motion. No thyromegaly present. Pulmonary/Chest: Effort normal and breath sounds normal. No stridor. No respiratory distress. She has no wheezes. She has no rales. Neurological: She is alert and oriented to person, place, and time. Skin: Skin is warm and dry. Capillary refill takes less than 2 seconds. No rash noted. She is not diaphoretic. No erythema. No pallor. There is purulent material from the left axilla. There is fibrous tissue present no surrounding cellulitis. There is a large cavity present at abscess location. Psychiatric: She has a normal mood and affect. Her behavior is normal.   Nursing note and vitals reviewed. MDM  Number of Diagnoses or Management Options  Abscess:   Diagnosis management comments: After I debrided fibrous tissue there was good granulation tissue underneath. Wound is open and likely will have delayed healing I am referring her to wound center and advised her to do wet-to-dry packing. No sign of any remaining infection on deep wound infection.       Shabbir Feliciano MD; 8/6/2019 @4:45 PM Voice dictation software was used during the making of this note. This software is not perfect and grammatical and other typographical errors may be present.   This note has not been proofread for errors.  ===================================================================              Procedures

## 2019-08-06 NOTE — DISCHARGE INSTRUCTIONS
Patient Education      Patient Education        Learning About Packing Your Wound  Overview  If you have a deep wound, your doctor may show you how to pack it. This helps keep the wound clean. It also helps it heal more evenly, from the inside out. You may be able to pack your wound yourself. Or you may need someone to help you reach it. It's important to wash your hands and keep the area clean when you pack the wound. Ask your doctor how often to change the packing and what supplies to use. How to get ready  How to get ready to pack your wound    1. Clean the table or sink where you will work. 2. Wash your hands with soap and water. 3. Cover your work area with a clean towel. 4. Put a clean bowl on the towel. Don't touch the inside of the bowl. 5. Lay out the rest of your supplies. How to prepare the packing material    1. Pour some wetting solution into the bowl. Use enough to cover your packing material.  2. Cut off some of the packing material.   Use the amount your doctor suggests. Place it in the wetting solution. 3. Cut a few pieces of tape, and have them ready. 4. Remove the old bandage and packing. Throw them away in a small plastic bag.  5. Wash your hands again with soap and water. How to pack your wound    1. Put on gloves. 2. Take packing material from the bowl. Gently squeeze it out. It should be wet, but not dripping wet. 3. Fill the wound with packing material.   Don't pack it too tightly. Use your fingers or a cotton swab to press the material into smaller areas of the wound. 4. Be gentle. Let your doctor know if it hurts too much. How to place the outer dressing    1. Open the package for the outer dressing. This dressing is used to cover the damp packing material.  2. Keep the outer dressing dry and clean. 3. Place the outer dressing over the packing and the wound area. Tape it down securely. 4. Throw away your gloves, and wash your hands one more time.     When should you call for help? Call your doctor now or seek immediate medical care if:    · You have symptoms of a new infection or of an infection that's getting worse, such as:  ? Increased pain, swelling, warmth, or redness. ? Red streaks leading from the area, or red streaks getting worse. ? Pus draining from the area or more pus than the bandage can absorb. ? A fever.     · You have lots of bleeding.     · Your wound is changing color or has a worse odor.    Watch closely for changes in your health, and be sure to contact your doctor if:    · You do not get better as expected. Follow-up care is a key part of your treatment and safety. Be sure to make and go to all appointments, and call your doctor if you are having problems. It's also a good idea to know your test results and keep a list of the medicines you take. Where can you learn more? Go to http://lizet-claudia.info/. Enter W150 in the search box to learn more about \"Learning About Packing Your Wound. \"  Current as of: September 23, 2018  Content Version: 12.1  © 5194-5793 [employer]. Care instructions adapted under license by [employer] (which disclaims liability or warranty for this information). If you have questions about a medical condition or this instruction, always ask your healthcare professional. [address] any warranty or liability for your use of this information. Skin Abscess: Care Instructions  Your Care Instructions    A skin abscess is a bacterial infection that forms a pocket of pus. A boil is a kind of skin abscess. The doctor may have cut an opening in the abscess so that the pus can drain out. You may have gauze in the cut so that the abscess will stay open and keep draining. You may need antibiotics. You will need to follow up with your doctor to make sure the infection has gone away. The doctor has checked you carefully, but problems can develop later.  If you notice any problems or new symptoms, get medical treatment right away. Follow-up care is a key part of your treatment and safety. Be sure to make and go to all appointments, and call your doctor if you are having problems. It's also a good idea to know your test results and keep a list of the medicines you take. How can you care for yourself at home? · Apply warm and dry compresses, a heating pad set on low, or a hot water bottle 3 or 4 times a day for pain. Keep a cloth between the heat source and your skin. · If your doctor prescribed antibiotics, take them as directed. Do not stop taking them just because you feel better. You need to take the full course of antibiotics. · Take pain medicines exactly as directed. ? If the doctor gave you a prescription medicine for pain, take it as prescribed. ? If you are not taking a prescription pain medicine, ask your doctor if you can take an over-the-counter medicine. · Keep your bandage clean and dry. Change the bandage whenever it gets wet or dirty, or at least one time a day. · If the abscess was packed with gauze:  ? Keep follow-up appointments to have the gauze changed or removed. If the doctor instructed you to remove the gauze, follow the instructions you were given for how to remove it. ? After the gauze is removed, soak the area in warm water for 15 to 20 minutes 2 times a day, until the wound closes. When should you call for help? Call your doctor now or seek immediate medical care if:    · You have signs of worsening infection, such as:  ? Increased pain, swelling, warmth, or redness. ? Red streaks leading from the infected skin. ? Pus draining from the wound. ? A fever.    Watch closely for changes in your health, and be sure to contact your doctor if:    · You do not get better as expected. Where can you learn more? Go to http://lizet-claudia.info/.   Enter H024 in the search box to learn more about \"Skin Abscess: Care Instructions. \"  Current as of: April 1, 2019  Content Version: 12.1  © 5393-4289 [employer]. Care instructions adapted under license by [employer] (which disclaims liability or warranty for this information). If you have questions about a medical condition or this instruction, always ask your healthcare professional. [address] any warranty or liability for your use of this information.

## 2019-08-06 NOTE — ED NOTES

## 2019-08-08 ENCOUNTER — HOSPITAL ENCOUNTER (EMERGENCY)
Age: 34
Discharge: HOME | End: 2019-08-08
Attending: EMERGENCY MEDICINE
Payer: SELF-PAY

## 2019-08-08 VITALS
SYSTOLIC BLOOD PRESSURE: 131 MMHG | DIASTOLIC BLOOD PRESSURE: 96 MMHG | RESPIRATION RATE: 16 BRPM | HEART RATE: 92 BPM | TEMPERATURE: 98.2 F | OXYGEN SATURATION: 99 %

## 2019-08-08 DIAGNOSIS — Z51.89 VISIT FOR WOUND CHECK: Primary | ICD-10-CM

## 2019-08-08 PROCEDURE — 99283 EMERGENCY DEPT VISIT LOW MDM: CPT | Performed by: PHYSICIAN ASSISTANT

## 2019-08-08 NOTE — ED PROVIDER NOTES
Patient is here for a wound check. She states she needs the dressing and packing changed. She had an abscess drained on Friday, 6 days ago. She is doing well and has been back every couple of days for packing removal and repacking. She is not having a fever, nausea, vomiting, chest pain, shortness of breath, abdominal pain, dizziness, weakness, dyspnea on exertion, orthopnea or other new symptoms. She was ambulatory to the room without difficulty and well-hydrated. The history is provided by the patient. Wound Check    This is a new problem. The current episode started more than 2 days ago. The problem occurs constantly. The problem has not changed since onset. Pain location: left axillae. The pain is at a severity of 1/10. The pain is mild. Pertinent negatives include no numbness, full range of motion, no stiffness, no tingling, no itching, no back pain and no neck pain. No past medical history on file. No past surgical history on file. No family history on file.     Social History     Socioeconomic History    Marital status: SINGLE     Spouse name: Not on file    Number of children: Not on file    Years of education: Not on file    Highest education level: Not on file   Occupational History    Not on file   Social Needs    Financial resource strain: Not on file    Food insecurity:     Worry: Not on file     Inability: Not on file    Transportation needs:     Medical: Not on file     Non-medical: Not on file   Tobacco Use    Smoking status: Current Every Day Smoker     Packs/day: 1.00    Smokeless tobacco: Never Used   Substance and Sexual Activity    Alcohol use: Yes     Comment: occasional    Drug use: No    Sexual activity: Yes     Partners: Male     Birth control/protection: None   Lifestyle    Physical activity:     Days per week: Not on file     Minutes per session: Not on file    Stress: Not on file   Relationships    Social connections:     Talks on phone: Not on file Gets together: Not on file     Attends Yazdanism service: Not on file     Active member of club or organization: Not on file     Attends meetings of clubs or organizations: Not on file     Relationship status: Not on file    Intimate partner violence:     Fear of current or ex partner: Not on file     Emotionally abused: Not on file     Physically abused: Not on file     Forced sexual activity: Not on file   Other Topics Concern    Not on file   Social History Narrative    Not on file         ALLERGIES: Patient has no known allergies. Review of Systems   Constitutional: Negative. HENT: Negative. Eyes: Negative. Respiratory: Negative. Cardiovascular: Negative. Gastrointestinal: Negative. Genitourinary: Negative. Musculoskeletal: Negative. Negative for back pain, neck pain and stiffness. Skin: Negative. Negative for itching. Neurological: Negative. Negative for tingling and numbness. Psychiatric/Behavioral: Negative. All other systems reviewed and are negative. Vitals:    08/08/19 1619   BP: (!) 131/96   Pulse: 92   Resp: 16   Temp: 98.2 °F (36.8 °C)   SpO2: 99%            Physical Exam   Constitutional: She is oriented to person, place, and time. She appears well-developed and well-nourished. HENT:   Head: Normocephalic and atraumatic. Right Ear: External ear normal.   Left Ear: External ear normal.   Nose: Nose normal.   Mouth/Throat: Oropharynx is clear and moist.   Eyes: Pupils are equal, round, and reactive to light. Conjunctivae and EOM are normal.   Neck: Normal range of motion. Neck supple. Cardiovascular: Normal rate, regular rhythm, normal heart sounds and intact distal pulses. Pulmonary/Chest: Effort normal and breath sounds normal.   Abdominal: Soft. Bowel sounds are normal.   Musculoskeletal: Normal range of motion. Neurological: She is alert and oriented to person, place, and time. She has normal reflexes. Skin: Skin is warm and dry.  No rash noted. No erythema. No pallor. Healing wound to left axillary area. No drainage, packing removed  and repacked without difficulty. Psychiatric: She has a normal mood and affect. Her behavior is normal. Judgment and thought content normal.   Nursing note and vitals reviewed. MDM  Number of Diagnoses or Management Options  Visit for wound check:   Risk of Complications, Morbidity, and/or Mortality  Presenting problems: low  Diagnostic procedures: low  Management options: low    Patient Progress  Patient progress: improved         Procedures    The patient was observed in the ED. The wound is repacked without difficulty. Patient will return in 2 to 3 days for packing removal and repacking. She is taking her antibiotics. She is stable for discharge at this time. A sterile dressing was placed. I discussed the results of all labs, procedures, radiographs, and treatments with the patient and available family. Treatment plan is agreed upon and the patient is ready for discharge. All voiced understanding of the discharge plan and medication instructions or changes as appropriate. Questions about treatment in the ED were answered. All were encouraged to return should symptoms worsen or new problems develop.

## 2019-08-08 NOTE — DISCHARGE INSTRUCTIONS
Patient Education   Wound Care: After Your Visit to the Emergency Room  Your Care Instructions  The care you need depends on the type of wound you have. Taking good care of your wound at home will help it heal quickly and will reduce your chance of infection. Even though you have been released from the emergency room, you still need to watch for any problems. The doctor carefully checked you. But sometimes problems can develop later. If you have new symptoms, or if your symptoms do not get better, return to the emergency room or call your doctor right away. A visit to the emergency room is only one step in your treatment. Even if you feel better, you still need to do what your doctor recommends, such as going to all suggested follow-up appointments and taking medicines exactly as directed. This will help you recover and help prevent future problems. How can you care for yourself at home? · Clean the area with soap and water 2 times a day, or as your doctor tells you. Don't use hydrogen peroxide or alcohol, which can slow healing. ¨ Unless your doctor gives you other directions, cover the wound with a thin layer of antibiotic ointment, such as bacitracin, and a bandage. Do not use an ointment that contains neomycin, because it can irritate the skin. ¨ Apply more ointment and replace the bandage as your doctor tells you. ¨ If the bandage is stuck to a scab, soak it in warm water to soften the scab. This will make the bandage easier to remove. · Ask your doctor if you can take an over-the-counter pain medicine. Do not take two or more pain medicines at the same time unless the doctor told you to. · Some pain is normal with a wound, but do not ignore pain that is getting worse instead of better. You could have an infection. · Your doctor may have closed your wound with stitches (sutures), staples, or skin glue. ¨ If you have stitches, your doctor may remove them after several days to 2 weeks.  Or you may have stitches that dissolve on their own. ¨ If you have staples, your doctor may remove them after 7 to 10 days. ¨ If your wound was closed with skin glue, the glue will wear off in a few days to 2 weeks. When should you call for help? Return to the emergency room now if:  · You have signs of infection, such as:  ¨ Increased pain, swelling, warmth, or redness around the wound. ¨ Red streaks leading from the wound. ¨ Pus draining from the wound. ¨ Swollen lymph nodes in your neck, armpits, or groin. ¨ A fever. · The wound starts to bleed, and blood soaks through the bandage. (Oozing small amounts of blood is normal.)  Call your doctor today if:  · The wound is not getting better each day. Where can you learn more? Go to [employer]  Enter P907 in the search box to learn more about \"Wound Care: After Your Visit to the Emergency Room. \"   © 1208-5486 [employer]. Care instructions adapted under license by [facility] (which disclaims liability or warranty for this information). This care instruction is for use with your licensed healthcare professional. If you have questions about a medical condition or this instruction, always ask your healthcare professional. [place] [address] any warranty or liability for your use of this information.   Content Version: 9.3.07060; Last Revised: July 22, 2010

## 2019-08-08 NOTE — ED NOTES
I have reviewed discharge instructions with the patient. The patient verbalized understanding. Patient left ED via Discharge Method: ambulatory to Home with (SELF). Opportunity for questions and clarification provided. Patient given 0 scripts. To continue your aftercare when you leave the hospital, you may receive an automated call from our care team to check in on how you are doing. This is a free service and part of our promise to provide the best care and service to meet your aftercare needs.  If you have questions, or wish to unsubscribe from this service please call [phone]. Thank you for Choosing our [facility] Emergency Department.

## 2022-11-04 ENCOUNTER — APPOINTMENT (OUTPATIENT)
Dept: GENERAL RADIOLOGY | Age: 37
End: 2022-11-04
Payer: COMMERCIAL

## 2022-11-04 ENCOUNTER — HOSPITAL ENCOUNTER (EMERGENCY)
Dept: CT IMAGING | Age: 37
Discharge: HOME | End: 2022-11-07
Payer: COMMERCIAL

## 2022-11-04 ENCOUNTER — HOSPITAL ENCOUNTER (OUTPATIENT)
Age: 37
Setting detail: OBSERVATION
Discharge: HOME | End: 2022-11-05
Attending: EMERGENCY MEDICINE | Admitting: INTERNAL MEDICINE
Payer: COMMERCIAL

## 2022-11-04 DIAGNOSIS — R77.8 ELEVATED TROPONIN: ICD-10-CM

## 2022-11-04 DIAGNOSIS — D64.9 ANEMIA, UNSPECIFIED TYPE: ICD-10-CM

## 2022-11-04 DIAGNOSIS — Z09 HOSPITAL DISCHARGE FOLLOW-UP: Primary | ICD-10-CM

## 2022-11-04 DIAGNOSIS — R00.2 PALPITATIONS: ICD-10-CM

## 2022-11-04 DIAGNOSIS — I47.1 SVT (SUPRAVENTRICULAR TACHYCARDIA) (HCC): ICD-10-CM

## 2022-11-04 PROBLEM — I10 HTN (HYPERTENSION): Status: ACTIVE | Noted: 2022-11-04

## 2022-11-04 PROBLEM — R06.09 DOE (DYSPNEA ON EXERTION): Status: ACTIVE | Noted: 2022-11-04

## 2022-11-04 LAB
ALBUMIN SERPL-MCNC: 2.5 G/DL (ref 3.5–5)
ALBUMIN/GLOB SERPL: 0.7 {RATIO} (ref 0.4–1.6)
ALP SERPL-CCNC: 72 U/L (ref 50–136)
ALT SERPL-CCNC: 21 U/L (ref 12–65)
AMPHET UR QL SCN: NEGATIVE
ANION GAP SERPL CALC-SCNC: 8 MMOL/L (ref 2–11)
APPEARANCE UR: ABNORMAL
AST SERPL-CCNC: 10 U/L (ref 15–37)
BACTERIA URNS QL MICRO: ABNORMAL /HPF
BENZODIAZ UR QL: NEGATIVE
BILIRUB SERPL-MCNC: 0.2 MG/DL (ref 0.2–1.1)
BILIRUB UR QL: NEGATIVE
BUN SERPL-MCNC: 28 MG/DL (ref 6–23)
CALCIUM SERPL-MCNC: 8.3 MG/DL (ref 8.3–10.4)
CANNABINOIDS UR QL SCN: POSITIVE
CASTS URNS QL MICRO: ABNORMAL /LPF
CHLORIDE SERPL-SCNC: 106 MMOL/L (ref 101–110)
CO2 SERPL-SCNC: 23 MMOL/L (ref 21–32)
COCAINE UR QL SCN: NEGATIVE
COLOR UR: ABNORMAL
CREAT SERPL-MCNC: 1.2 MG/DL (ref 0.6–1)
EKG DIAGNOSIS: NORMAL
EKG Q-T INTERVAL: 276 MS
EKG QRS DURATION: 64 MS
EKG QTC CALCULATION (BAZETT): 450 MS
EKG R AXIS: 66 DEGREES
EKG T AXIS: -39 DEGREES
EKG VENTRICULAR RATE: 160 BPM
EPI CELLS #/AREA URNS HPF: ABNORMAL /HPF
ERYTHROCYTE [DISTWIDTH] IN BLOOD BY AUTOMATED COUNT: 12.6 % (ref 11.9–14.6)
EST. AVERAGE GLUCOSE BLD GHB EST-MCNC: 217 MG/DL
GLOBULIN SER CALC-MCNC: 3.7 G/DL (ref 2.8–4.5)
GLUCOSE SERPL-MCNC: 270 MG/DL (ref 65–100)
GLUCOSE UR STRIP.AUTO-MCNC: 100 MG/DL
HBA1C MFR BLD: 9.2 % (ref 4.8–5.6)
HCG UR QL: NEGATIVE
HCG UR QL: NEGATIVE
HCT VFR BLD AUTO: 29.8 % (ref 35.8–46.3)
HGB BLD-MCNC: 9.8 G/DL (ref 11.7–15.4)
HGB UR QL STRIP: ABNORMAL
KETONES UR QL STRIP.AUTO: ABNORMAL MG/DL
LEUKOCYTE ESTERASE UR QL STRIP.AUTO: NEGATIVE
LIPASE SERPL-CCNC: 60 U/L (ref 73–393)
MAGNESIUM SERPL-MCNC: 1.9 MG/DL (ref 1.8–2.4)
MCH RBC QN AUTO: 29.6 PG (ref 26.1–32.9)
MCHC RBC AUTO-ENTMCNC: 32.9 G/DL (ref 31.4–35)
MCV RBC AUTO: 90 FL (ref 82–102)
NITRITE UR QL STRIP.AUTO: NEGATIVE
NRBC # BLD: 0 K/UL (ref 0–0.2)
NT PRO BNP: 4723 PG/ML (ref 5–125)
OPIATES UR QL: NEGATIVE
OTHER OBSERVATIONS: ABNORMAL
PH UR STRIP: 5.5 [PH] (ref 5–9)
PLATELET # BLD AUTO: 230 K/UL (ref 150–450)
PMV BLD AUTO: 10.3 FL (ref 9.4–12.3)
POTASSIUM SERPL-SCNC: 3.9 MMOL/L (ref 3.5–5.1)
PROT SERPL-MCNC: 6.2 G/DL (ref 6.3–8.2)
PROT UR STRIP-MCNC: 300 MG/DL
RBC # BLD AUTO: 3.31 M/UL (ref 4.05–5.2)
RBC #/AREA URNS HPF: ABNORMAL /HPF
SARS-COV-2 RDRP RESP QL NAA+PROBE: NOT DETECTED
SODIUM SERPL-SCNC: 137 MMOL/L (ref 133–143)
SOURCE: NORMAL
SP GR UR REFRACTOMETRY: 1.03 (ref 1–1.02)
TROPONIN I SERPL HS-MCNC: 193.1 PG/ML (ref 0–14)
TROPONIN I SERPL HS-MCNC: 367.7 PG/ML (ref 0–14)
TROPONIN I SERPL HS-MCNC: 90.8 PG/ML (ref 0–14)
TSH W FREE THYROID IF ABNORMAL: 1.42 UIU/ML (ref 0.36–3.74)
UROBILINOGEN UR QL STRIP.AUTO: 0.2 EU/DL (ref 0.2–1)
WBC # BLD AUTO: 6.1 K/UL (ref 4.3–11.1)
WBC URNS QL MICRO: ABNORMAL /HPF

## 2022-11-04 PROCEDURE — 96375 TX/PRO/DX INJ NEW DRUG ADDON: CPT

## 2022-11-04 PROCEDURE — 81025 URINE PREGNANCY TEST: CPT

## 2022-11-04 PROCEDURE — 87635 SARS-COV-2 COVID-19 AMP PRB: CPT

## 2022-11-04 PROCEDURE — 85027 COMPLETE CBC AUTOMATED: CPT

## 2022-11-04 PROCEDURE — 99285 EMERGENCY DEPT VISIT HI MDM: CPT

## 2022-11-04 PROCEDURE — 93005 ELECTROCARDIOGRAM TRACING: CPT | Performed by: PHYSICIAN ASSISTANT

## 2022-11-04 PROCEDURE — 84443 ASSAY THYROID STIM HORMONE: CPT

## 2022-11-04 PROCEDURE — 6370000000 HC RX 637 (ALT 250 FOR IP): Performed by: PHYSICIAN ASSISTANT

## 2022-11-04 PROCEDURE — 83735 ASSAY OF MAGNESIUM: CPT

## 2022-11-04 PROCEDURE — 6360000002 HC RX W HCPCS: Performed by: PHYSICIAN ASSISTANT

## 2022-11-04 PROCEDURE — 80307 DRUG TEST PRSMV CHEM ANLYZR: CPT

## 2022-11-04 PROCEDURE — 2580000003 HC RX 258: Performed by: PHYSICIAN ASSISTANT

## 2022-11-04 PROCEDURE — 71260 CT THORAX DX C+: CPT | Performed by: PHYSICIAN ASSISTANT

## 2022-11-04 PROCEDURE — G0378 HOSPITAL OBSERVATION PER HR: HCPCS

## 2022-11-04 PROCEDURE — 83880 ASSAY OF NATRIURETIC PEPTIDE: CPT

## 2022-11-04 PROCEDURE — 96376 TX/PRO/DX INJ SAME DRUG ADON: CPT

## 2022-11-04 PROCEDURE — 6360000002 HC RX W HCPCS: Performed by: EMERGENCY MEDICINE

## 2022-11-04 PROCEDURE — 93005 ELECTROCARDIOGRAM TRACING: CPT | Performed by: EMERGENCY MEDICINE

## 2022-11-04 PROCEDURE — 81001 URINALYSIS AUTO W/SCOPE: CPT

## 2022-11-04 PROCEDURE — 1100000003 HC PRIVATE W/ TELEMETRY

## 2022-11-04 PROCEDURE — 96374 THER/PROPH/DIAG INJ IV PUSH: CPT

## 2022-11-04 PROCEDURE — 70450 CT HEAD/BRAIN W/O DYE: CPT

## 2022-11-04 PROCEDURE — 80053 COMPREHEN METABOLIC PANEL: CPT

## 2022-11-04 PROCEDURE — 2580000003 HC RX 258: Performed by: EMERGENCY MEDICINE

## 2022-11-04 PROCEDURE — 71046 X-RAY EXAM CHEST 2 VIEWS: CPT

## 2022-11-04 PROCEDURE — 83036 HEMOGLOBIN GLYCOSYLATED A1C: CPT

## 2022-11-04 PROCEDURE — 36415 COLL VENOUS BLD VENIPUNCTURE: CPT

## 2022-11-04 PROCEDURE — 84484 ASSAY OF TROPONIN QUANT: CPT

## 2022-11-04 PROCEDURE — 83690 ASSAY OF LIPASE: CPT

## 2022-11-04 PROCEDURE — 99213 OFFICE O/P EST LOW 20 MIN: CPT | Performed by: INTERNAL MEDICINE

## 2022-11-04 PROCEDURE — 6360000004 HC RX CONTRAST MEDICATION: Performed by: PHYSICIAN ASSISTANT

## 2022-11-04 PROCEDURE — 6370000000 HC RX 637 (ALT 250 FOR IP): Performed by: EMERGENCY MEDICINE

## 2022-11-04 RX ORDER — ATORVASTATIN CALCIUM 40 MG/1
40 TABLET, FILM COATED ORAL NIGHTLY
Status: DISCONTINUED | OUTPATIENT
Start: 2022-11-04 | End: 2022-11-04

## 2022-11-04 RX ORDER — METOPROLOL SUCCINATE 25 MG/1
25 TABLET, EXTENDED RELEASE ORAL DAILY
Status: DISCONTINUED | OUTPATIENT
Start: 2022-11-04 | End: 2022-11-05 | Stop reason: HOSPADM

## 2022-11-04 RX ORDER — ADENOSINE 3 MG/ML
6 INJECTION, SOLUTION INTRAVENOUS ONCE
Status: COMPLETED | OUTPATIENT
Start: 2022-11-04 | End: 2022-11-04

## 2022-11-04 RX ORDER — ONDANSETRON 4 MG/1
4 TABLET, ORALLY DISINTEGRATING ORAL EVERY 8 HOURS PRN
Status: DISCONTINUED | OUTPATIENT
Start: 2022-11-04 | End: 2022-11-05 | Stop reason: HOSPADM

## 2022-11-04 RX ORDER — ASPIRIN 325 MG
325 TABLET ORAL
Status: COMPLETED | OUTPATIENT
Start: 2022-11-04 | End: 2022-11-04

## 2022-11-04 RX ORDER — SODIUM CHLORIDE 0.9 % (FLUSH) 0.9 %
10 SYRINGE (ML) INJECTION
Status: COMPLETED | OUTPATIENT
Start: 2022-11-04 | End: 2022-11-04

## 2022-11-04 RX ORDER — ONDANSETRON 2 MG/ML
4 INJECTION INTRAMUSCULAR; INTRAVENOUS EVERY 6 HOURS PRN
Status: DISCONTINUED | OUTPATIENT
Start: 2022-11-04 | End: 2022-11-05 | Stop reason: HOSPADM

## 2022-11-04 RX ORDER — ACETAMINOPHEN 325 MG/1
650 TABLET ORAL EVERY 6 HOURS PRN
Status: DISCONTINUED | OUTPATIENT
Start: 2022-11-04 | End: 2022-11-05 | Stop reason: HOSPADM

## 2022-11-04 RX ORDER — SODIUM CHLORIDE 9 MG/ML
INJECTION, SOLUTION INTRAVENOUS PRN
Status: DISCONTINUED | OUTPATIENT
Start: 2022-11-04 | End: 2022-11-05 | Stop reason: HOSPADM

## 2022-11-04 RX ORDER — SODIUM CHLORIDE 0.9 % (FLUSH) 0.9 %
5-40 SYRINGE (ML) INJECTION PRN
Status: DISCONTINUED | OUTPATIENT
Start: 2022-11-04 | End: 2022-11-05 | Stop reason: HOSPADM

## 2022-11-04 RX ORDER — FUROSEMIDE 10 MG/ML
40 INJECTION INTRAMUSCULAR; INTRAVENOUS ONCE
Status: COMPLETED | OUTPATIENT
Start: 2022-11-04 | End: 2022-11-04

## 2022-11-04 RX ORDER — POLYETHYLENE GLYCOL 3350 17 G/17G
17 POWDER, FOR SOLUTION ORAL DAILY PRN
Status: DISCONTINUED | OUTPATIENT
Start: 2022-11-04 | End: 2022-11-05 | Stop reason: HOSPADM

## 2022-11-04 RX ORDER — 0.9 % SODIUM CHLORIDE 0.9 %
1000 INTRAVENOUS SOLUTION INTRAVENOUS ONCE
Status: COMPLETED | OUTPATIENT
Start: 2022-11-04 | End: 2022-11-04

## 2022-11-04 RX ORDER — DILTIAZEM HYDROCHLORIDE 5 MG/ML
10 INJECTION INTRAVENOUS
Status: DISCONTINUED | OUTPATIENT
Start: 2022-11-04 | End: 2022-11-04

## 2022-11-04 RX ORDER — ACETAMINOPHEN 650 MG/1
650 SUPPOSITORY RECTAL EVERY 6 HOURS PRN
Status: DISCONTINUED | OUTPATIENT
Start: 2022-11-04 | End: 2022-11-05 | Stop reason: HOSPADM

## 2022-11-04 RX ORDER — FUROSEMIDE 10 MG/ML
40 INJECTION INTRAMUSCULAR; INTRAVENOUS ONCE
Status: COMPLETED | OUTPATIENT
Start: 2022-11-05 | End: 2022-11-04

## 2022-11-04 RX ORDER — 0.9 % SODIUM CHLORIDE 0.9 %
100 INTRAVENOUS SOLUTION INTRAVENOUS
Status: COMPLETED | OUTPATIENT
Start: 2022-11-04 | End: 2022-11-04

## 2022-11-04 RX ORDER — SODIUM CHLORIDE 0.9 % (FLUSH) 0.9 %
5-40 SYRINGE (ML) INJECTION EVERY 12 HOURS SCHEDULED
Status: DISCONTINUED | OUTPATIENT
Start: 2022-11-04 | End: 2022-11-05 | Stop reason: HOSPADM

## 2022-11-04 RX ADMIN — SODIUM CHLORIDE 1000 ML: 900 INJECTION, SOLUTION INTRAVENOUS at 13:24

## 2022-11-04 RX ADMIN — IOPAMIDOL 100 ML: 755 INJECTION, SOLUTION INTRAVENOUS at 14:16

## 2022-11-04 RX ADMIN — ASPIRIN 325 MG ORAL TABLET 325 MG: 325 PILL ORAL at 16:16

## 2022-11-04 RX ADMIN — SODIUM CHLORIDE, PRESERVATIVE FREE 10 ML: 5 INJECTION INTRAVENOUS at 14:16

## 2022-11-04 RX ADMIN — FUROSEMIDE 40 MG: 10 INJECTION, SOLUTION INTRAMUSCULAR; INTRAVENOUS at 23:57

## 2022-11-04 RX ADMIN — FUROSEMIDE 40 MG: 10 INJECTION, SOLUTION INTRAMUSCULAR; INTRAVENOUS at 15:34

## 2022-11-04 RX ADMIN — METOPROLOL SUCCINATE 25 MG: 25 TABLET, FILM COATED, EXTENDED RELEASE ORAL at 17:57

## 2022-11-04 RX ADMIN — Medication 10 ML: at 20:43

## 2022-11-04 RX ADMIN — SODIUM CHLORIDE 100 ML: 9 INJECTION, SOLUTION INTRAVENOUS at 14:16

## 2022-11-04 RX ADMIN — ADENOSINE 6 MG: 3 INJECTION INTRAVENOUS at 13:36

## 2022-11-04 ASSESSMENT — ENCOUNTER SYMPTOMS
BACK PAIN: 0
WHEEZING: 0
SHORTNESS OF BREATH: 0
DIARRHEA: 0
VOMITING: 0
NAUSEA: 0
ABDOMINAL PAIN: 0
RHINORRHEA: 0
COUGH: 0

## 2022-11-04 ASSESSMENT — PAIN SCALES - GENERAL
PAINLEVEL_OUTOF10: 0
PAINLEVEL_OUTOF10: 0

## 2022-11-04 ASSESSMENT — PAIN - FUNCTIONAL ASSESSMENT: PAIN_FUNCTIONAL_ASSESSMENT: 0-10

## 2022-11-04 NOTE — H&P
[facility] Cardiology Initial Cardiac Evaluation      Date of  Admission: 11/4/2022 12:41 PM     Primary Care Physician: None Provider  Primary Cardiologist: None  Referring Physician: Dr Donavon Kirk  Supervising Physician: Dr Patty Calixto    CC/Reason for evaluation: SVT    HPI:  Abdi Dove is a 40 y.o. female with no prior PMH who presented to [facility] ED on 11/4 from urgent care with complaint of palpitations and tachycardia. Reports getting ready for work when she had sudden onset of palpitations. Had associated dizziness and nausea therefore presented to ED. At urgent care heart rate was 160's therefore directed to ED. Upon arrival in ED, EKG showed SVT. Patient was given adenosine with resolution. CXR negative. CT chest showed mild interstitial edema at lung bases and negative for pulmonary embolism. CT head negative. Labs showed WBC 6.1, H/H 9.8/29.8, Ptl 230, Na 137, K 3.9, BUN/Cr 28/1.20, Mag 1.9, pBNP 4723, hs trop 90.8- 193.1, albumin 2.5. UDS positive for THC. Given IV lasix 40 in ED. Cardiology consulted for SVT. Patient reports similar episode this past Monday. States heart was racing for ~5 hours but then resolved on its own. No past medical history on file. No past surgical history on file.     Allergies   Allergen Reactions    Nickel Rash      Social History     Socioeconomic History    Marital status: Single     Spouse name: Not on file    Number of children: Not on file    Years of education: Not on file    Highest education level: Not on file   Occupational History    Not on file   Tobacco Use    Smoking status: Every Day     Packs/day: 1.00     Types: Cigarettes    Smokeless tobacco: Never   Substance and Sexual Activity    Alcohol use: Yes    Drug use: No    Sexual activity: Not on file   Other Topics Concern    Not on file   Social History Narrative    Not on file     Social Determinants of Health     Financial Resource Strain: Not on file   Food Insecurity: Not on file   Transportation Needs: Not on file   Physical Activity: Not on file   Stress: Not on file   Social Connections: Not on file   Intimate Partner Violence: Not on file   Housing Stability: Not on file     Social History       Tobacco History       Smoking Status  Every Day Smoking Frequency  1.00 packs/day Smoking Tobacco Type  Cigarettes      Smokeless Tobacco Use  Never              Alcohol History       Alcohol Use Status  Yes              Drug Use       Drug Use Status  No              Sexual Activity       Sexually Active  Not Asked Birth Control/Protection  None                    No family history on file. Current Facility-Administered Medications   Medication Dose Route Frequency    aspirin tablet 325 mg  325 mg Oral NOW     Current Outpatient Medications   Medication Sig    albuterol sulfate  (90 Base) MCG/ACT inhaler Inhale 2 puffs into the lungs every 6 hours as needed    chlorhexidine (HIBICLENS) 4 % external liquid Wash once daily, avoiding genitals and eyes and allow to stay on for 30 seconds then wash off. 1 week duration. mupirocin (BACTROBAN) 2 % ointment Apply to the inside of both nostrils twice daily for 7 days. traMADol (ULTRAM) 50 MG tablet Take 50 mg by mouth every 6 hours as needed. Facility-Administered Medications Ordered in Other Encounters   Medication Dose Route Frequency    0.9 % sodium chloride bolus  100 mL IntraVENous ONCE PRN       Review of Symptoms:      General: No weight changes,  weakness, fever or chills  Skin: no rashes, lumps, or other skin changes  HEENT: no headache, dizziness, lightheadedness, vision changes, hearing changes, tinnitus, vertigo, sinus pressure/pain, bleeding gums, sore throat, or hoarseness  Neck: no swollen glands, goiter, pain or stiffness  Respiratory: Positive for dyspnea.  No cough, sputum, hemoptysis,  wheezing  Cardiovascular: + as per HPI  Gastrointestinal: no GERD, constipation, diarrhea, liver problems, or h/o GI bleed  Urinary: no frequency, urgency , hematuria, burning/pain with urination, recent flank pain, polyuria, nocturia, or difficulty urinating  Peripheral Vascular: no claudication, leg cramps, prior DVTs, swelling of calves, legs, or feet, color change, or swelling with redness or tenderness  Musculoskeletal: no muscle or joint pain/stiffness, joint swelling, erythema of joints, or back pain  Psychiatric: no depression or excessive stress  Neurological: no sensory or motor loss, seizures, syncope, tremors, numbness, no dementia  Hematologic: no anemia, easy bruising or bleeding  Endocrine: No thyroid problems, heat or cold intolerance, excessive sweating, polyuria, polydipsia, diabetes. Subjective:     Physical Exam:    Vitals:    11/04/22 1503 11/04/22 1518 11/04/22 1538 11/04/22 1548   BP:  (!) 154/100 (!) 155/75 (!) 145/66   Pulse: 88  78 78   Resp: 20  23 11   Temp:       TempSrc:       SpO2: 99% 100% 100% 100%   Weight:       Height:           General: Well Developed, Well Nourished, No Acute Distress  HEENT: pupils equal and round, no abnormalities noted  Neck: supple, no JVD, no carotid bruits  Heart: S1S2 with RRR without murmurs or gallops  Lungs: Clear throughout auscultation bilaterally without adventitious sounds  Abd: soft, nontender, nondistended, with good bowel sounds  Ext: warm, no edema, calves supple/nontender, pulses 2+ bilaterally  Skin: warm and dry  Psychiatric: Normal mood and affect  Neurologic: Alert and oriented X 3    Cardiographics    Telemetry: normal sinus rhythm  ECG: SVT  Echocardiogram: No results found for this or any previous visit.         Labs:     Recent Labs     11/04/22  1241   WBC 6.1   HGB 9.8*   HCT 29.8*   MCV 90.0        Lab Results   Component Value Date    WBC 6.1 11/04/2022    HGB 9.8 (L) 11/04/2022    HCT 29.8 (L) 11/04/2022     11/04/2022    ALT 21 11/04/2022    AST 10 (L) 11/04/2022     11/04/2022    K 3.9 11/04/2022     11/04/2022    CREATININE 1.20 (H) 11/04/2022    BUN 28 (H) 11/04/2022    CO2 23 11/04/2022      Pt has been seen and examined by Dr. Andi Boone. He agrees with the following assessment and plan. Assessment/Plan:       SVT  - resolved s/p adenosine  - admit and monitor on telemetry   - ECHO   - start toprol     HTN  - start toprol   - monitor and titrate medications as needed     Mild volume overload  - IV lasix in ED and one in am  - reassess   - labs in morning     Anemia  - check labs in morning     Patient seen and examined by me plan is for an admission for echo evaluation mild diuresis for some lower extremity edema trending of her troponin that is likely insignificant and type II secondary to her SVT her SVT she has had 2 episodes of in the last week first 1 broke spontaneously second 1 she was given adenosine here in the emergency room she will be rounded on by electrophysiology with the idea that they may then pursue an outpatient SVT ablation if they feel it is appropriate    Thank you for requesting cardiac evaluation and allowing us to participate in the care of this patient. We will continue to follow along with you.       King Saravia PA-C  Supervising Physician: Dr Andi Boone

## 2022-11-04 NOTE — PROGRESS NOTES
TRANSFER - IN REPORT:    Verbal report received from [address] on [facility] being received from ED () for routine progression of care. Report consisted of patients Situation, Background, Assessment and Recommendations(SBAR). Information from the following report(s) SBAR and ED Summary was reviewed. Opportunity for questions and clarification was provided. Assessment completed upon patients arrival to unit and care assumed. Patient received to room 327. Patient connected to monitor and assessment completed. Plan of care reviewed. Patient oriented to room and call light. Patient aware to use call light to communicate any chest pain or needs. Admission skin assessment completed with second RN and reveals the following: pt presents to unit sacrum and heels intact and free of redness. Pt has scattered scars/ tattoo, and facial piercing present.  Dual skin assessment completed with Stephany Rg RN

## 2022-11-04 NOTE — ED TRIAGE NOTES
Patient ambulatory to triage after being at urgent care with CO high BP and lightheadedness today. Denies CP but reports palpitations. Denies hx of fast HR but reports episode on Monday of this week. Reports vomiting this morning.

## 2022-11-04 NOTE — ED PROVIDER NOTES
Emergency Department Provider Note                   PCP:                None Provider               Age: 40 y.o. Sex: female       ICD-10-CM    1. SVT (supraventricular tachycardia) ([facility])  I47.1       2. Elevated troponin  R77.8           DISPOSITION Decision To Admit 11/04/2022 04:00:26 PM        MDM  Number of Diagnoses or Management Options  Elevated troponin  SVT (supraventricular tachycardia) ([facility] [address]): new, needed workup  Diagnosis management comments: Patient with SVT. Valsalva maneuvers attempted but unsuccessful. Adenosine 6 mg IV given. Converted to NSR. HR in 80s. Trop 90.8. Pro-BNP 4,723.  2 view chest x-ray with clear lungs. No pleural effusion. No pneumothorax. No cardiomegaly. Orders were placed in triage by PA for CT head, CT PE protocol. Lasix 40 mg IV ordered. Urine pregnancy test negative. CT head negative for any acute or concerning findings. CT chest w/ no PE. Possible mild interstitial edema at lung bases. No pleural effusions. Repeat troponin doubled to 193.1. Denies active chest pain. Aspirin ordered. Amount and/or Complexity of Data Reviewed  Clinical lab tests: ordered and reviewed  Tests in the radiology section of CPT®: ordered and reviewed  Tests in the medicine section of CPT®: ordered and reviewed  Review and summarize past medical records: yes  Discuss the patient with other providers: yes  Independent visualization of images, tracings, or specimens: yes    Risk of Complications, Morbidity, and/or Mortality  Presenting problems: high  Diagnostic procedures: moderate  Management options: high    Critical Care  Total time providing critical care: 30-74 minutes    Patient Progress  Patient progress: stable       ED Course as of 11/04/22 1604   Fri Nov 04, 2022   1343 NT Pro-BNP(!): 4,723 [DF]   1344 Troponin, High Sensitivity(!): 90.8 [DF]   1437 2 view Chest X-ray IMPRESSION:  The lungs are clear. The heart size is normal in size. No pneumothorax.  No pleural effusions. [DF]   3554 CT chest PE protocol IMPRESSION:  1. No evidence of pulmonary embolism. Thoracic aorta is unremarkable. 2.  Possible mild interstitial edema at the lung bases. Heart is normal in size. No pleural effusions. [DF]   6700 CT head FINDINGS: No areas of abnormal attenuation are seen in the brain. There is no CT  evidence of acute hemorrhage or infarction. The ventricles are normal in size. There are no extra-axial fluid collections. No masses are seen. The sinuses are  clear. There are no bony lesions. IMPRESSION:  No CT evidence of acute intracranial abnormality. [DF]      ED Course User Index  [DF] Rancho Bucio MD        Orders Placed This Encounter   Procedures    Critical Care    COVID-19, Rapid    XR CHEST (2 VW)    CT CHEST PULMONARY EMBOLISM W CONTRAST    CT HEAD WO CONTRAST    CBC    Comprehensive Metabolic Panel    Troponin    Lipase    Magnesium    Brain Natriuretic Peptide    TSH with Reflex    Drug Screen Psych, Urine    Pregnancy, Urine    Urinalysis w rflx microscopic    Cardiac Monitor    Pulse Oximetry    EKG 12 Lead    EKG 12 Lead    EKG 12 Lead    Saline lock IV        Medications   aspirin tablet 325 mg (has no administration in time range)   0.9 % sodium chloride bolus (1,000 mLs IntraVENous New Bag 11/4/22 1324)   adenosine (ADENOCARD) injection 6 mg (6 mg IntraVENous Given 11/4/22 1336)   furosemide (LASIX) injection 40 mg (40 mg IntraVENous Given 11/4/22 1534)       New Prescriptions    No medications on file        Reji Mckee is a 40 y.o. female who presents to the Emergency Department with chief complaint of palpitations. Chief Complaint   Patient presents with    Palpitations      60-year-old female presents with complaint of palpitations and dizziness since waking up this morning. States that her heart felt like it was racing. States that she had a room spinning sensation that has since resolved.   Denies focal weakness, numbness, tingling, headache, difficulty ambulating. Patient reports chronic bilateral lower extreme edema. Denies history of PE, DVT. Patient denies history of A. fib or SVT. Denies history of CAD. States symptoms are exacerbated with ambulating. Patient states that she noted palpitations on last Monday that resolved on its own. Denies illicit drug use. Reports occasional alcohol use. Denies any significant chest discomfort at this time. Denies abdominal pain, fever, chills, hemoptysis, productive cough. The history is provided by the patient. No  was used. Review of Systems   Constitutional:  Negative for chills, diaphoresis, fatigue and fever. HENT:  Negative for congestion and rhinorrhea. Eyes:  Negative for visual disturbance. Respiratory:  Negative for cough, shortness of breath and wheezing. Cardiovascular:  Positive for palpitations. Negative for chest pain. Gastrointestinal:  Negative for abdominal pain, diarrhea, nausea and vomiting. Genitourinary:  Negative for dysuria and flank pain. Musculoskeletal:  Negative for arthralgias, back pain and myalgias. Skin:  Negative for rash and wound. Neurological:  Negative for tremors, syncope, facial asymmetry, weakness, numbness and headaches. Hematological:  Does not bruise/bleed easily. Psychiatric/Behavioral:  Negative for confusion. Past Medical history: History of labial abscess, multiple cutaneous abscesses to extremities    Past surgical history: s/p I&D abscess    Family history: Reviewed. Father with history of DM type II, HTN. Mother  from lung cancer. Social history: Reports occasional alcohol intake. Denies illicit drug use. States that she smokes around a pack per day of cigarettes.     Social History     Socioeconomic History    Marital status: Single   Tobacco Use    Smoking status: Every Day     Packs/day: 1.00     Types: Cigarettes    Smokeless tobacco: Never   Substance and Sexual Activity    Alcohol use: Yes    Drug use: No         Nickel     Previous Medications    ALBUTEROL SULFATE  (90 BASE) MCG/ACT INHALER    Inhale 2 puffs into the lungs every 6 hours as needed    CHLORHEXIDINE (HIBICLENS) 4 % EXTERNAL LIQUID    Wash once daily, avoiding genitals and eyes and allow to stay on for 30 seconds then wash off. 1 week duration. MUPIROCIN (BACTROBAN) 2 % OINTMENT    Apply to the inside of both nostrils twice daily for 7 days. TRAMADOL (ULTRAM) 50 MG TABLET    Take 50 mg by mouth every 6 hours as needed. Vitals signs and nursing note reviewed. Patient Vitals for the past 4 hrs:   Temp Pulse Resp BP SpO2   11/04/22 1518 -- -- -- (!) 154/100 100 %   11/04/22 1503 -- 88 20 -- 99 %   11/04/22 1350 -- 81 15 (!) 161/95 100 %   11/04/22 1330 -- (!) 152 18 (!) 155/103 99 %   11/04/22 1326 -- (!) 152 -- (!) 144/105 100 %   11/04/22 1253 -- (!) 155 21 (!) 159/111 98 %   11/04/22 1234 98.8 °F (37.1 °C) (!) 168 20 (!) 163/116 98 %          Physical Exam  Vitals and nursing note reviewed. Constitutional:       Appearance: Normal appearance. Comments: Pt in no acute distress. HENT:      Head: Normocephalic. Nose: Nose normal.      Mouth/Throat:      Mouth: Mucous membranes are moist.      Pharynx: No oropharyngeal exudate or posterior oropharyngeal erythema. Eyes:      Extraocular Movements: Extraocular movements intact. Pupils: Pupils are equal, round, and reactive to light. Cardiovascular:      Rate and Rhythm: Tachycardia present. Pulses: Normal pulses. Heart sounds: Normal heart sounds. Pulmonary:      Effort: Pulmonary effort is normal.      Breath sounds: Normal breath sounds. Abdominal:      General: Bowel sounds are normal.      Palpations: Abdomen is soft. Tenderness: There is no abdominal tenderness. There is no guarding or rebound. Musculoskeletal:         General: No deformity. Normal range of motion.       Cervical back: Normal range of motion. No rigidity. Right lower leg: Edema present. Left lower leg: Edema present. Comments: 2+ pitting edema to bilateral lower extremities. No calf TTP. No palpable cords. Skin:     General: Skin is warm. Findings: No rash. Neurological:      General: No focal deficit present. Mental Status: She is alert and oriented to person, place, and time. Cranial Nerves: No cranial nerve deficit. Sensory: No sensory deficit. Motor: No weakness. Comments: Strength 5 out of 5 throughout. Normal sensory exam.  No facial droop. No dysarthria. No drift.    Psychiatric:         Mood and Affect: Mood normal.         Behavior: Behavior normal.        EKG 12 Lead    Date/Time: 11/4/2022 1:39 PM  Performed by: Itz Deluna MD  Authorized by: ZOEY Shipman     ECG reviewed by ED Physician in the absence of a cardiologist: yes    Rate:     ECG rate:  160    ECG rate assessment: tachycardic    Rhythm:     Rhythm: SVT    Ectopy:     Ectopy: none    QRS:     QRS axis:  Normal    QRS intervals:  Normal    QRS conduction: normal    ST segments:     ST segments:  Normal  T waves:     T waves: normal    EKG 12 Lead    Date/Time: 11/4/2022 1:40 PM  Performed by: Itz Deluna MD  Authorized by: Itz Deluna MD     ECG reviewed by ED Physician in the absence of a cardiologist: yes    Rate:     ECG rate:  83    ECG rate assessment: normal    Rhythm:     Rhythm: sinus rhythm    QRS:     QRS axis:  Normal    QRS intervals:  Normal    QRS conduction: normal    ST segments:     ST segments:  Normal  T waves:     T waves: inverted      Inverted:  III  Other findings:     Other findings: DEVI    Critical Care  Performed by: Itz Deluna MD  Authorized by: Itz Deluna MD     Critical care provider statement:     Critical care time (minutes):  35    Critical care time was exclusive of:  Separately billable procedures and treating other patients    Critical care was necessary to treat or prevent imminent or life-threatening deterioration of the following conditions:  Cardiac failure and circulatory failure    Critical care was time spent personally by me on the following activities:  Blood draw for specimens, development of treatment plan with patient or surrogate, discussions with consultants, discussions with primary provider, evaluation of patient's response to treatment, examination of patient, obtaining history from patient or surrogate, ordering and performing treatments and interventions, ordering and review of laboratory studies, pulse oximetry, ordering and review of radiographic studies, re-evaluation of patient's condition and review of old charts    I assumed direction of critical care for this patient from another provider in my specialty: yes      Care discussed with: admitting provider    Comments:      Patient with SVT requiring adenosine 6 mg IV. Results for orders placed or performed during the hospital encounter of 11/04/22   COVID-19, Rapid    Specimen: Nasopharyngeal   Result Value Ref Range    Source NASAL      SARS-CoV-2, Rapid Not detected NOTD     XR CHEST (2 VW)    Narrative    XR CHEST (2 VW) 11/4/2022 1:03 PM     HISTORY: Hypertension and lightheadedness. Palpitations. COMPARISON: Chest x-ray 8/19/2016. AP and lateral views of the chest were obtained. Impression    The lungs are clear. The heart size is normal in size. No  pneumothorax. No pleural effusions. CT CHEST PULMONARY EMBOLISM W CONTRAST    Narrative    CHEST CT    INDICATION: Hypertension and lightheadedness. Vomiting. Multiple axial images were obtained through the chest during intravenous  infusion of 100mL of Isovue 370. Coronal reformats were also evaluated. Radiation dose reduction techniques were used for this study:   All CT scans  performed at this facility use one or all of the following: Automated exposure  control, adjustment of the mA and/or kVp according to patient's size, iterative  reconstruction. COMPARISON: None    FINDINGS:  ANGIOGRAM CHEST: Pulmonary arteries are normal caliber and negative for  pulmonary emboli. Thoracic aorta is negative for dissection. No CT evidence of  right heart strain. LUNGS AND PLEURA: Mild interlobular septal thickening noted at the lung bases  possibly some interstitial edema. Lungs are otherwise clear. No pleural  effusions. MEDIASTINUM/AXILLAE: Heart is normal in size. No pericardial effusion. Esophagus  and thyroid gland are unremarkable. No enlarged lymph nodes. UPPER ABDOMEN: Normal.    MUSCULOSKELETAL: Normal.      Impression    1. No evidence of pulmonary embolism. Thoracic aorta is unremarkable. 2.  Possible mild interstitial edema at the lung bases. Heart is normal in size. No pleural effusions. CT HEAD WO CONTRAST    Narrative    Head CT    INDICATION: Hypertension and lightheadedness. Multiple axial images obtained through the brain without intravenous contrast.   Radiation dose reduction techniques were used for this study: All CT scans  performed at this facility use one or all of the following: Automated exposure  control, adjustment of the mA and/or kVp according to patient's size, iterative  reconstruction. FINDINGS: No areas of abnormal attenuation are seen in the brain. There is no CT  evidence of acute hemorrhage or infarction. The ventricles are normal in size. There are no extra-axial fluid collections. No masses are seen. The sinuses are  clear. There are no bony lesions. Impression    No CT evidence of acute intracranial abnormality.      CBC   Result Value Ref Range    WBC 6.1 4.3 - 11.1 K/uL    RBC 3.31 (L) 4.05 - 5.2 M/uL    Hemoglobin 9.8 (L) 11.7 - 15.4 g/dL    Hematocrit 29.8 (L) 35.8 - 46.3 %    MCV 90.0 82 - 102 FL    MCH 29.6 26.1 - 32.9 PG    MCHC 32.9 31.4 - 35.0 g/dL    RDW 12.6 11.9 - 14.6 %    Platelets 230 150 - 450 K/uL    MPV 10.3 9.4 - 12.3 FL    nRBC 0.00 0.0 - 0.2 K/uL   Comprehensive Metabolic Panel   Result Value Ref Range    Sodium 137 133 - 143 mmol/L    Potassium 3.9 3.5 - 5.1 mmol/L    Chloride 106 101 - 110 mmol/L    CO2 23 21 - 32 mmol/L    Anion Gap 8 2 - 11 mmol/L    Glucose 270 (H) 65 - 100 mg/dL    BUN 28 (H) 6 - 23 MG/DL    Creatinine 1.20 (H) 0.6 - 1.0 MG/DL    Est, Glom Filt Rate >60 >60 ml/min/1.73m2    Calcium 8.3 8.3 - 10.4 MG/DL    Total Bilirubin 0.2 0.2 - 1.1 MG/DL    ALT 21 12 - 65 U/L    AST 10 (L) 15 - 37 U/L    Alk Phosphatase 72 50 - 136 U/L    Total Protein 6.2 (L) 6.3 - 8.2 g/dL    Albumin 2.5 (L) 3.5 - 5.0 g/dL    Globulin 3.7 2.8 - 4.5 g/dL    Albumin/Globulin Ratio 0.7 0.4 - 1.6     Troponin   Result Value Ref Range    Troponin, High Sensitivity 90.8 (H) 0 - 14 pg/mL   Troponin   Result Value Ref Range    Troponin, High Sensitivity 193.1 (HH) 0 - 14 pg/mL   Lipase   Result Value Ref Range    Lipase 60 (L) 73 - 393 U/L   Magnesium   Result Value Ref Range    Magnesium 1.9 1.8 - 2.4 mg/dL   Brain Natriuretic Peptide   Result Value Ref Range    NT Pro-BNP 4,723 (H) 5 - 125 PG/ML   TSH with Reflex   Result Value Ref Range    TSH w Free Thyroid if Abnormal 1.42 0.358 - 3.740 UIU/ML   Drug Screen Psych, Urine   Result Value Ref Range    Benzodiazepines, Urine Negative NEG      Opiates, Urine Negative NEG      Amphetamine, Urine Negative NEG      Cocaine, Urine Negative NEG      THC, TH-Cannabinol, Urine Positive (A) NEG     Urinalysis w rflx microscopic   Result Value Ref Range    Color, UA YELLOW/STRAW      Appearance CLOUDY      Specific Gravity, UA 1.035 (H) 1.001 - 1.023      pH, Urine 5.5 5.0 - 9.0      Protein,  (A) NEG mg/dL    Glucose,  mg/dL    Ketones, Urine TRACE (A) NEG mg/dL    Bilirubin Urine Negative NEG      Blood, Urine LARGE (A) NEG      Urobilinogen, Urine 0.2 0.2 - 1.0 EU/dL    Nitrite, Urine Negative NEG      Leukocyte Esterase, Urine Negative NEG EKG 12 Lead   Result Value Ref Range    Ventricular Rate 160 BPM    QRS Duration 64 ms    Q-T Interval 276 ms    QTc Calculation (Bazett) 450 ms    R Axis 66 degrees    T Axis -39 degrees    Diagnosis Supraventricular tachycardia         CT CHEST PULMONARY EMBOLISM W CONTRAST   Final Result   1. No evidence of pulmonary embolism. Thoracic aorta is unremarkable. 2.  Possible mild interstitial edema at the lung bases. Heart is normal in size. No pleural effusions. CT HEAD WO CONTRAST   Final Result   No CT evidence of acute intracranial abnormality. XR CHEST (2 VW)   Final Result   The lungs are clear. The heart size is normal in size. No   pneumothorax. No pleural effusions. Voice dictation software was used during the making of this note. This software is not perfect and grammatical and other typographical errors may be present. This note has not been completely proofread for errors.      Julian Brooks MD  11/04/22 1602       Rancho Paniagua MD  11/04/22 6122

## 2022-11-04 NOTE — ED NOTES
TRANSFER - OUT REPORT:    Verbal report given to Myrna on [facility]  being transferred to [facility] for routine progression of patient care       Report consisted of patient's Situation, Background, Assessment and   Recommendations(SBAR). Information from the following report(s) ED Encounter Summary, ED SBAR and [facility] was reviewed with the receiving nurse. Lines:   Peripheral IV 11/04/22 Left Antecubital (Active)   Site Assessment Clean, dry & intact 11/04/22 1255   Line Status Blood return noted; Flushed 11/04/22 1255   Phlebitis Assessment No symptoms 11/04/22 1255   Infiltration Assessment 0 11/04/22 1255        Opportunity for questions and clarification was provided.       Patient transported with:  Monitor and Registered Nurse       Andi Giles RN  11/04/22 0535

## 2022-11-05 ENCOUNTER — APPOINTMENT (OUTPATIENT)
Dept: NON INVASIVE DIAGNOSTICS | Age: 37
End: 2022-11-05
Payer: COMMERCIAL

## 2022-11-05 VITALS
HEIGHT: 63 IN | RESPIRATION RATE: 18 BRPM | TEMPERATURE: 98.1 F | SYSTOLIC BLOOD PRESSURE: 153 MMHG | DIASTOLIC BLOOD PRESSURE: 90 MMHG | HEART RATE: 80 BPM | OXYGEN SATURATION: 100 % | BODY MASS INDEX: 36.61 KG/M2 | WEIGHT: 206.6 LBS

## 2022-11-05 LAB
ANION GAP SERPL CALC-SCNC: 6 MMOL/L (ref 2–11)
BUN SERPL-MCNC: 30 MG/DL (ref 6–23)
CALCIUM SERPL-MCNC: 8.3 MG/DL (ref 8.3–10.4)
CHLORIDE SERPL-SCNC: 107 MMOL/L (ref 101–110)
CHOLEST SERPL-MCNC: 210 MG/DL
CO2 SERPL-SCNC: 25 MMOL/L (ref 21–32)
CREAT SERPL-MCNC: 1.3 MG/DL (ref 0.6–1)
ECHO AO ASC DIAM: 2.9 CM
ECHO AO ASCENDING AORTA INDEX: 1.48 CM/M2
ECHO AO ROOT DIAM: 2.9 CM
ECHO AO ROOT INDEX: 1.48 CM/M2
ECHO AV AREA PEAK VELOCITY: 1.8 CM2
ECHO AV AREA VTI: 1.8 CM2
ECHO AV AREA/BSA PEAK VELOCITY: 0.9 CM2/M2
ECHO AV AREA/BSA VTI: 0.9 CM2/M2
ECHO AV MEAN GRADIENT: 7 MMHG
ECHO AV MEAN VELOCITY: 1.2 M/S
ECHO AV PEAK GRADIENT: 13 MMHG
ECHO AV PEAK VELOCITY: 1.8 M/S
ECHO AV VELOCITY RATIO: 0.61
ECHO AV VTI: 35.9 CM
ECHO BSA: 2.01 M2
ECHO EST RA PRESSURE: 8 MMHG
ECHO IVC PROX: 2.1 CM
ECHO LA AREA 2C: 20.5 CM2
ECHO LA AREA 4C: 24.1 CM2
ECHO LA DIAMETER INDEX: 1.99 CM/M2
ECHO LA DIAMETER: 3.9 CM
ECHO LA MAJOR AXIS: 6.1 CM
ECHO LA MINOR AXIS: 5.2 CM
ECHO LA TO AORTIC ROOT RATIO: 1.34
ECHO LA VOL BP: 74 ML (ref 22–52)
ECHO LA VOL/BSA BIPLANE: 38 ML/M2 (ref 16–34)
ECHO LV E' LATERAL VELOCITY: 11 CM/S
ECHO LV E' SEPTAL VELOCITY: 8 CM/S
ECHO LV EDV 3D: 157 ML
ECHO LV EDV INDEX 3D: 80 ML/M2
ECHO LV EJECTION FRACTION 3D: 59 %
ECHO LV ESV 3D: 65 ML
ECHO LV ESV INDEX 3D: 33 ML/M2
ECHO LV FRACTIONAL SHORTENING: 30 % (ref 28–44)
ECHO LV INTERNAL DIMENSION DIASTOLE INDEX: 2.35 CM/M2
ECHO LV INTERNAL DIMENSION DIASTOLIC: 4.6 CM (ref 3.9–5.3)
ECHO LV INTERNAL DIMENSION SYSTOLIC INDEX: 1.63 CM/M2
ECHO LV INTERNAL DIMENSION SYSTOLIC: 3.2 CM
ECHO LV IVSD: 1.1 CM (ref 0.6–0.9)
ECHO LV MASS 2D: 181.2 G (ref 67–162)
ECHO LV MASS 3D INDEX: 85.7 G/M2
ECHO LV MASS 3D: 168 G
ECHO LV MASS INDEX 2D: 92.5 G/M2 (ref 43–95)
ECHO LV POSTERIOR WALL DIASTOLIC: 1.1 CM (ref 0.6–0.9)
ECHO LV RELATIVE WALL THICKNESS RATIO: 0.48
ECHO LVOT AREA: 2.8 CM2
ECHO LVOT AV VTI INDEX: 0.62
ECHO LVOT DIAM: 1.9 CM
ECHO LVOT MEAN GRADIENT: 3 MMHG
ECHO LVOT PEAK GRADIENT: 5 MMHG
ECHO LVOT PEAK VELOCITY: 1.1 M/S
ECHO LVOT STROKE VOLUME INDEX: 32.1 ML/M2
ECHO LVOT SV: 62.9 ML
ECHO LVOT VTI: 22.2 CM
ECHO MV A VELOCITY: 1.26 M/S
ECHO MV E DECELERATION TIME (DT): 215 MS
ECHO MV E VELOCITY: 1.04 M/S
ECHO MV E/A RATIO: 0.83
ECHO MV E/E' LATERAL: 9.45
ECHO MV E/E' RATIO (AVERAGED): 11.23
ECHO MV E/E' SEPTAL: 13
ECHO PV MAX VELOCITY: 1.9 M/S
ECHO PV PEAK GRADIENT: 14 MMHG
ECHO RIGHT VENTRICULAR SYSTOLIC PRESSURE (RVSP): 45 MMHG
ECHO RV BASAL DIMENSION: 3.3 CM
ECHO RV TAPSE: 2.4 CM (ref 1.7–?)
ECHO TV REGURGITANT MAX VELOCITY: 3.04 M/S
ECHO TV REGURGITANT PEAK GRADIENT: 37 MMHG
ERYTHROCYTE [DISTWIDTH] IN BLOOD BY AUTOMATED COUNT: 12.3 % (ref 11.9–14.6)
GLUCOSE SERPL-MCNC: 331 MG/DL (ref 65–100)
HCT VFR BLD AUTO: 24.7 % (ref 35.8–46.3)
HDLC SERPL-MCNC: 38 MG/DL (ref 40–60)
HDLC SERPL: 5.5 {RATIO}
HGB BLD-MCNC: 8.2 G/DL (ref 11.7–15.4)
LDLC SERPL CALC-MCNC: 147.4 MG/DL
LV EF: 59 %
LVEF MODALITY: ABNORMAL
MCH RBC QN AUTO: 29.6 PG (ref 26.1–32.9)
MCHC RBC AUTO-ENTMCNC: 33.2 G/DL (ref 31.4–35)
MCV RBC AUTO: 89.2 FL (ref 82–102)
NRBC # BLD: 0 K/UL (ref 0–0.2)
PLATELET # BLD AUTO: 206 K/UL (ref 150–450)
PMV BLD AUTO: 11.2 FL (ref 9.4–12.3)
POTASSIUM SERPL-SCNC: 3.7 MMOL/L (ref 3.5–5.1)
RBC # BLD AUTO: 2.77 M/UL (ref 4.05–5.2)
SODIUM SERPL-SCNC: 138 MMOL/L (ref 133–143)
TRIGL SERPL-MCNC: 123 MG/DL (ref 35–150)
VLDLC SERPL CALC-MCNC: 24.6 MG/DL (ref 6–23)
WBC # BLD AUTO: 5.1 K/UL (ref 4.3–11.1)

## 2022-11-05 PROCEDURE — 6370000000 HC RX 637 (ALT 250 FOR IP): Performed by: PHYSICIAN ASSISTANT

## 2022-11-05 PROCEDURE — 6360000002 HC RX W HCPCS: Performed by: PHYSICIAN ASSISTANT

## 2022-11-05 PROCEDURE — 93306 TTE W/DOPPLER COMPLETE: CPT | Performed by: INTERNAL MEDICINE

## 2022-11-05 PROCEDURE — G0378 HOSPITAL OBSERVATION PER HR: HCPCS

## 2022-11-05 PROCEDURE — 80048 BASIC METABOLIC PNL TOTAL CA: CPT

## 2022-11-05 PROCEDURE — 85027 COMPLETE CBC AUTOMATED: CPT

## 2022-11-05 PROCEDURE — 36415 COLL VENOUS BLD VENIPUNCTURE: CPT

## 2022-11-05 PROCEDURE — 80061 LIPID PANEL: CPT

## 2022-11-05 PROCEDURE — 96375 TX/PRO/DX INJ NEW DRUG ADDON: CPT

## 2022-11-05 PROCEDURE — 99220 PR INITIAL OBSERVATION CARE/DAY 70 MINUTES: CPT | Performed by: INTERNAL MEDICINE

## 2022-11-05 PROCEDURE — 93306 TTE W/DOPPLER COMPLETE: CPT

## 2022-11-05 PROCEDURE — 76376 3D RENDER W/INTRP POSTPROCES: CPT | Performed by: INTERNAL MEDICINE

## 2022-11-05 PROCEDURE — 93005 ELECTROCARDIOGRAM TRACING: CPT | Performed by: PHYSICIAN ASSISTANT

## 2022-11-05 RX ORDER — HYDRALAZINE HYDROCHLORIDE 20 MG/ML
10 INJECTION INTRAMUSCULAR; INTRAVENOUS EVERY 6 HOURS PRN
Status: DISCONTINUED | OUTPATIENT
Start: 2022-11-05 | End: 2022-11-05 | Stop reason: HOSPADM

## 2022-11-05 RX ORDER — METOPROLOL SUCCINATE 50 MG/1
50 TABLET, EXTENDED RELEASE ORAL DAILY
Qty: 30 TABLET | Refills: 3 | Status: SHIPPED | OUTPATIENT
Start: 2022-11-06

## 2022-11-05 RX ORDER — METOPROLOL SUCCINATE 50 MG/1
25 TABLET, EXTENDED RELEASE ORAL DAILY
Qty: 30 TABLET | Refills: 3 | Status: SHIPPED | OUTPATIENT
Start: 2022-11-06 | End: 2022-11-05 | Stop reason: SDUPTHER

## 2022-11-05 RX ADMIN — HYDRALAZINE HYDROCHLORIDE 10 MG: 20 INJECTION INTRAMUSCULAR; INTRAVENOUS at 12:03

## 2022-11-05 RX ADMIN — METOPROLOL SUCCINATE 25 MG: 25 TABLET, FILM COATED, EXTENDED RELEASE ORAL at 10:36

## 2022-11-05 ASSESSMENT — PAIN SCALES - GENERAL: PAINLEVEL_OUTOF10: 0

## 2022-11-05 NOTE — CONSULTS
[facility] Cardiology/Electrophyiology Consult                Date of  Admission: 11/4/2022 12:41 PM     CC/Reason for consult: SVT    Satish Gallardo is a 40 y.o. female admitted for SVT (supraventricular tachycardia) (HCC) [I47.1]  Elevated troponin [R77.8]. 45yo F with a history of HTN admitted with SVT that terminated with adenosine and EP was consulted for management of SVT. Pt reports the abrupt onset of rapid HR associated with dizziness, lightheadedness, nausea and SOB. Pt was seen in urgent care with HR 160s and was sent to the ER. Upon arrival in ED, EKG showed SVT. Patient was given adenosine with resolution. CXR negative. CT chest showed mild interstitial edema at lung bases and negative for pulmonary embolism. CT head negative. Pt reports prior episode earlier this week lasting about 5 hours. Cardiac PMH: (Old records have been reviewed and summarized below)  TTE pending    Patient Active Problem List   Diagnosis    SVT (supraventricular tachycardia) (HCC)    HTN (hypertension)    DIAZ (dyspnea on exertion)    Palpitations       Past Medical History:   Diagnosis Date    HTN (hypertension) 11/4/2022      No past surgical history on file. Allergies   Allergen Reactions    Nickel Rash      No family history on file.      Current Facility-Administered Medications   Medication Dose Route Frequency    metoprolol succinate (TOPROL XL) extended release tablet 25 mg  25 mg Oral Daily    sodium chloride flush 0.9 % injection 5-40 mL  5-40 mL IntraVENous 2 times per day    sodium chloride flush 0.9 % injection 5-40 mL  5-40 mL IntraVENous PRN    0.9 % sodium chloride infusion   IntraVENous PRN    ondansetron (ZOFRAN-ODT) disintegrating tablet 4 mg  4 mg Oral Q8H PRN    Or    ondansetron (ZOFRAN) injection 4 mg  4 mg IntraVENous Q6H PRN    acetaminophen (TYLENOL) tablet 650 mg  650 mg Oral Q6H PRN    Or    acetaminophen (TYLENOL) suppository 650 mg  650 mg Rectal Q6H PRN    polyethylene glycol (GLYCOLAX) packet 17 g  17 g Oral Daily PRN       Review of Symptoms:  A comprehensive ROS was performed with the pertinent positives and negatives mentioned in the HPI, all other systems reviewed and are negative       Physical Exam  Vitals:    11/04/22 1728 11/04/22 2026 11/05/22 0008 11/05/22 0436   BP: (!) 178/87 (!) 171/84 (!) 144/87 (!) 156/79   Pulse: 77 74 76 79   Resp: 16 16 17 17   Temp: 97.9 °F (36.6 °C) 98 °F (36.7 °C) 98.4 °F (36.9 °C) 98.5 °F (36.9 °C)   TempSrc: Oral  Oral Oral   SpO2: 100% 100% 100% 100%   Weight: 206 lb (93.4 kg)   206 lb 9.6 oz (93.7 kg)   Height:           Physical Exam:  General appearance - Alert, well appearing, and in no distress   Mental status - Affect appropriate to mood. Neck - Carotids upstroke normal bilaterally  Resp - Clear to auscultation, no wheezes, rales or rhonchi, symmetric air entry. Heart - Normal rate, regular rhythm, normal S1, S2, no murmurs, rubs, clicks or gallops. GI - Soft, nontender, nondistended  Neurological - Grossly intact - normal speech, no focal findings    Cardiographics    Telemetry:   ECG (Indpendently visualized and interpreted): , mid RP tachycardia  Echocardiogram: pending    Labs:   Recent Labs     11/04/22  1241 11/05/22  0417    138   K 3.9 3.7   MG 1.9  --    BUN 28* 30*   WBC 6.1 5.1   HGB 9.8* 8.2*   HCT 29.8* 24.7*    206   HDL  --  38*        Assessment:      Principal Problem:    SVT (supraventricular tachycardia) (HCC)  Active Problems:    HTN (hypertension)    DIAZ (dyspnea on exertion)    Palpitations  Resolved Problems:    * No resolved hospital problems. *    47yo F with a history of HTN admitted with SVT that terminated with adenosine and EP was consulted for management of SVT. Plan:   1.  SVT: I had a discussion with the Pt today regarding the diagnosis of supraventricular tachycardia and treatment options including AV chelita blocking drugs, antiarrhythmic therapy, catheter ablation and/or the combination of the above. I discussed at length the advantages and disadvantages of all treatment strategies. We discussed the option of cardiac ablation in detail, including discussion of some of the more common, however, very low risks of the procedure including access complications and risks of damage to the heart or surrounding structures. A more detailed discussion of possible complications from cardiac ablation will be addressed again during the consent process the day of the procedure. The patient will also meet with the staff anesthesiologist the day of the procedure to discuss some of the same possible risks, as well as other risks, specific to the patient undergoing anesthesia. We also discussed some of the limitations of an electrophysiology study including non-inducibility that would limit ablation options. --EPS and possible SVT ablation       --plan to scheduled as OP    2. Anemia: H/H 8.2/24.7 this AM, unclear etiology, needs close OP work up, CBC on Monday    3. DM: new diagnosis, A1C 9.2, plan to discharge with metformin 500mg Q12H and close follow up with PCP    4. Dispo: home today after echo     Thank you very much for this referral. We appreciate the opportunity to participate in this patient's care. We will follow along with above stated plan. David Mack MD, MS  Cardiology/Electrophysiology

## 2022-11-05 NOTE — CARE COORDINATION
Pt chart reviewed for discharge planning. Pt is employed, insured and is normally independent with all ADL's. Pt has no listed PCP so we will place referral for a [facility] PCP for follow up care. Pt is for discharge home today with family and no needs/supportive care orders recieved for CM at this time.

## 2022-11-05 NOTE — DISCHARGE SUMMARY
[facility] Cardiology Discharge Summary     Patient ID:  Jennifer Palma  [id number]  40 y.o.  1985    Admit date: 11/4/2022    Discharge date:  11/5/2022    Admitting Physician: Gatito Keys MD     Discharge Physician: Dr. Yanelis Cline    Admission Diagnoses: SVT (supraventricular tachycardia) ([address] [I47.1]  Elevated troponin [R77.8]    Discharge Diagnoses:   Patient Active Problem List    Diagnosis    SVT (supraventricular tachycardia) ([address]    HTN (hypertension)    DIAZ (dyspnea on exertion)    New onset diabetes      Cardiology Procedures this admission:  EchoCardiogram    Consults: none    Hospital Course: Patient presented to [facility] ED with complaints of palpitations, shortness of breath and elevated heart rates. The patient was noted to be in SVT on arrival.  Patient was given adenosine with resolution. Patient was admitted to telemetry for further monitoring. Started on toprol XL 25 mg daily. ECHO was performed and showed:   Left Ventricle Normal left ventricular systolic function. EF 3D is 59%. Left ventricle size is normal. Mildly increased wall thickness. Normal wall motion. Normal diastolic function. Left Atrium Left atrial volume index is mildly increased (35-41 mL/m2). Right Ventricle Right ventricle size is normal. Normal systolic function. Right Atrium Right atrium size is normal.   Aortic Valve Tricuspid valve. No regurgitation. No stenosis. Mitral Valve Mildly thickened leaflet, at the anterior and posterior leaflets. Trace regurgitation. No stenosis noted. Tricuspid Valve Valve structure is normal. Mild regurgitation. No stenosis noted. Pulmonic Valve Valve structure is normal. Mild regurgitation. No stenosis noted. Aorta Normal sized sinus of Valsalva. IVC/Hepatic Veins IVC borderline enlarged. Respiratory variation not well assessed on this study. Pericardium Small (<1 cm) pericardial effusion present atthe distal RV free wall. No indication of cardiac tamponade. Evidence includes normal LV size, no septal bounce, no chamber collapse, no significant respiratory chamber variation and no significant respiratory transvalvular variation. On labs, patient was noted to have elevated glucose therefore Hgb A1c was done. Hgb A1c elevated at 9.2. Patient was started on metformin. Instructed to follow up with PCP in one week. Also, found to have anemia. Patient was given labs slip for CBC on Monday. The patient felt much better back in sinus rhythm. On 11/5/2022, the patient was feeling much better and remained in sinus rhythm. The patient was seen and examined by Dr. Prinec Torres and was determined stable and ready for discharge home. The patient was instructed on the importance of medication compliance and outpatient follow up. The patient will follow up with [facility] Dr. Prince Torres for SVT follow up. DISPOSITION: The patient is being discharged home in stable condition on a low saturated fat, low cholesterol and low salt diet. The patient is instructed to advance activities as tolerated to the limit of fatigue or shortness of breath. The patient is instructed to call the office or return to the ER for immediate evaluation for any severe shortness of breath, chest pain, prolonged palpitations, near syncope or syncope. Discharge Exam: BP (!) 156/79   Pulse 79   Temp 98.5 °F (36.9 °C) (Oral)   Resp 17   Ht 5' 3\" (1.6 m)   Wt 206 lb 9.6 oz (93.7 kg)   SpO2 100%   BMI 36.60 kg/m²   Patient has been seen by Dr. Prince Torres: see his progress note for exam details.     Recent Results (from the past 24 hour(s))   EKG 12 Lead    Collection Time: 11/04/22 12:26 PM   Result Value Ref Range    Ventricular Rate 160 BPM    QRS Duration 64 ms    Q-T Interval 276 ms    QTc Calculation (Bazett) 450 ms    R Axis 66 degrees    T Axis -39 degrees    Diagnosis Supraventricular tachycardia    Hemoglobin A1C    Collection Time: 11/04/22 12:38 PM   Result Value Ref Range    Hemoglobin A1C 9.2 (H) 4.8 - 5.6 %    eAG 217 mg/dL   CBC    Collection Time: 11/04/22 12:41 PM   Result Value Ref Range    WBC 6.1 4.3 - 11.1 K/uL    RBC 3.31 (L) 4.05 - 5.2 M/uL    Hemoglobin 9.8 (L) 11.7 - 15.4 g/dL    Hematocrit 29.8 (L) 35.8 - 46.3 %    MCV 90.0 82 - 102 FL    MCH 29.6 26.1 - 32.9 PG    MCHC 32.9 31.4 - 35.0 g/dL    RDW 12.6 11.9 - 14.6 %    Platelets 305 659 - 610 K/uL    MPV 10.3 9.4 - 12.3 FL    nRBC 0.00 0.0 - 0.2 K/uL   Comprehensive Metabolic Panel    Collection Time: 11/04/22 12:41 PM   Result Value Ref Range    Sodium 137 133 - 143 mmol/L    Potassium 3.9 3.5 - 5.1 mmol/L    Chloride 106 101 - 110 mmol/L    CO2 23 21 - 32 mmol/L    Anion Gap 8 2 - 11 mmol/L    Glucose 270 (H) 65 - 100 mg/dL    BUN 28 (H) 6 - 23 MG/DL    Creatinine 1.20 (H) 0.6 - 1.0 MG/DL    Est, Glom Filt Rate >60 >60 ml/min/1.73m2    Calcium 8.3 8.3 - 10.4 MG/DL    Total Bilirubin 0.2 0.2 - 1.1 MG/DL    ALT 21 12 - 65 U/L    AST 10 (L) 15 - 37 U/L    Alk Phosphatase 72 50 - 136 U/L    Total Protein 6.2 (L) 6.3 - 8.2 g/dL    Albumin 2.5 (L) 3.5 - 5.0 g/dL    Globulin 3.7 2.8 - 4.5 g/dL    Albumin/Globulin Ratio 0.7 0.4 - 1.6     Troponin    Collection Time: 11/04/22 12:41 PM   Result Value Ref Range    Troponin, High Sensitivity 90.8 (H) 0 - 14 pg/mL   Lipase    Collection Time: 11/04/22 12:41 PM   Result Value Ref Range    Lipase 60 (L) 73 - 393 U/L   Magnesium    Collection Time: 11/04/22 12:41 PM   Result Value Ref Range    Magnesium 1.9 1.8 - 2.4 mg/dL   COVID-19, Rapid    Collection Time: 11/04/22 12:41 PM    Specimen: Nasopharyngeal   Result Value Ref Range    Source NASAL      SARS-CoV-2, Rapid Not detected NOTD     Brain Natriuretic Peptide    Collection Time: 11/04/22 12:41 PM   Result Value Ref Range    NT Pro-BNP 4,723 (H) 5 - 125 PG/ML   TSH with Reflex    Collection Time: 11/04/22 12:41 PM   Result Value Ref Range    TSH w Free Thyroid if Abnormal 1.42 0.358 - 3.740 UIU/ML   Drug Screen Psych, Urine    Collection Time: 11/04/22  1:30 PM   Result Value Ref Range    Benzodiazepines, Urine Negative NEG      Opiates, Urine Negative NEG      Amphetamine, Urine Negative NEG      Cocaine, Urine Negative NEG      THC, TH-Cannabinol, Urine Positive (A) NEG     Urinalysis w rflx microscopic    Collection Time: 11/04/22  2:00 PM   Result Value Ref Range    Color, UA YELLOW/STRAW      Appearance CLOUDY      Specific Gravity, UA 1.035 (H) 1.001 - 1.023      pH, Urine 5.5 5.0 - 9.0      Protein,  (A) NEG mg/dL    Glucose,  mg/dL    Ketones, Urine TRACE (A) NEG mg/dL    Bilirubin Urine Negative NEG      Blood, Urine LARGE (A) NEG      Urobilinogen, Urine 0.2 0.2 - 1.0 EU/dL    Nitrite, Urine Negative NEG      Leukocyte Esterase, Urine Negative NEG      WBC, UA 5-10 0 /hpf    RBC, UA 10-20 0 /hpf    Epithelial Cells UA 20-50 0 /hpf    BACTERIA, URINE 3+ (H) 0 /hpf    Casts HYALINE 0 /lpf    OTHER OBSERVATIONS RESULTS VERIFIED MANUALLY     Troponin    Collection Time: 11/04/22  3:00 PM   Result Value Ref Range    Troponin, High Sensitivity 193.1 (HH) 0 - 14 pg/mL   Pregnancy, Urine    Collection Time: 11/04/22  3:08 PM   Result Value Ref Range    HCG(Urine) Pregnancy Test Negative NEG     POC Pregnancy Urine Qual    Collection Time: 11/04/22  4:10 PM   Result Value Ref Range    Preg Test, Ur Negative NEG     Troponin    Collection Time: 11/04/22  7:26 PM   Result Value Ref Range    Troponin, High Sensitivity 367.7 (HH) 0 - 14 pg/mL   CBC    Collection Time: 11/05/22  4:17 AM   Result Value Ref Range    WBC 5.1 4.3 - 11.1 K/uL    RBC 2.77 (L) 4.05 - 5.2 M/uL    Hemoglobin 8.2 (L) 11.7 - 15.4 g/dL    Hematocrit 24.7 (L) 35.8 - 46.3 %    MCV 89.2 82 - 102 FL    MCH 29.6 26.1 - 32.9 PG    MCHC 33.2 31.4 - 35.0 g/dL    RDW 12.3 11.9 - 14.6 %    Platelets 469 466 - 747 K/uL    MPV 11.2 9.4 - 12.3 FL    nRBC 0.00 0.0 - 0.2 K/uL   Lipid Panel    Collection Time: 11/05/22  4:17 AM   Result Value Ref Range    Cholesterol, Total 210 (H) <200 MG/DL    Triglycerides 123 35 - 150 MG/DL    HDL 38 (L) 40 - 60 MG/DL    LDL Calculated 147.4 (H) <100 MG/DL    VLDL Cholesterol Calculated 24.6 (H) 6.0 - 23.0 MG/DL    Chol/HDL Ratio 5.5     Basic Metabolic Panel w/ Reflex to MG    Collection Time: 11/05/22  4:17 AM   Result Value Ref Range    Sodium 138 133 - 143 mmol/L    Potassium 3.7 3.5 - 5.1 mmol/L    Chloride 107 101 - 110 mmol/L    CO2 25 21 - 32 mmol/L    Anion Gap 6 2 - 11 mmol/L    Glucose 331 (H) 65 - 100 mg/dL    BUN 30 (H) 6 - 23 MG/DL    Creatinine 1.30 (H) 0.6 - 1.0 MG/DL    Est, Glom Filt Rate 55 (L) >60 ml/min/1.73m2    Calcium 8.3 8.3 - 10.4 MG/DL   EKG 12 lead    Collection Time: 11/05/22  7:49 AM   Result Value Ref Range    Ventricular Rate 73 BPM    Atrial Rate 73 BPM    P-R Interval 150 ms    QRS Duration 80 ms    Q-T Interval 400 ms    QTc Calculation (Bazett) 440 ms    P Axis 45 degrees    R Axis 7 degrees    T Axis 9 degrees    Diagnosis Normal sinus rhythm      Patient Instructions:   Current Discharge Medication List        START taking these medications    Details   metFORMIN (GLUCOPHAGE) 500 MG tablet Take 1 tablet by mouth 2 times daily (with meals)  Qty: 60 tablet, Refills: 3      metoprolol succinate (TOPROL XL) 50 MG extended release tablet Take 1 tablet by mouth daily  Qty: 30 tablet, Refills: 3           CONTINUE these medications which have NOT CHANGED    Details   albuterol sulfate  (90 Base) MCG/ACT inhaler Inhale 2 puffs into the lungs every 6 hours as needed      chlorhexidine (HIBICLENS) 4 % external liquid Wash once daily, avoiding genitals and eyes and allow to stay on for 30 seconds then wash off. 1 week duration. mupirocin (BACTROBAN) 2 % ointment Apply to the inside of both nostrils twice daily for 7 days. traMADol (ULTRAM) 50 MG tablet Take 50 mg by mouth every 6 hours as needed.              Signed:  Dawson Bautista PA-C  11/5/2022  9:02 AM

## 2022-11-06 LAB
EKG ATRIAL RATE: 73 BPM
EKG DIAGNOSIS: NORMAL
EKG P AXIS: 45 DEGREES
EKG P-R INTERVAL: 150 MS
EKG Q-T INTERVAL: 400 MS
EKG QRS DURATION: 80 MS
EKG QTC CALCULATION (BAZETT): 440 MS
EKG R AXIS: 7 DEGREES
EKG T AXIS: 9 DEGREES
EKG VENTRICULAR RATE: 73 BPM

## 2022-11-09 ENCOUNTER — TELEPHONE (OUTPATIENT)
Dept: INTERNAL MEDICINE CLINIC | Facility: CLINIC | Age: 37
End: 2022-11-09

## 2023-01-31 ENCOUNTER — NURSE TRIAGE (OUTPATIENT)
Dept: OTHER | Facility: CLINIC | Age: 38
End: 2023-01-31

## 2023-01-31 NOTE — TELEPHONE ENCOUNTER
Location of patient: [address] [place] [address]    Received call from Corina oliva at [facility] with [employer]. Subjective: Caller states \"My BP today at the dentist's office is 200/116. When I was in the hospital in November, 2022, my BP was even higher. The BP med they gave me was Metoprolol. \"     Onset:  unknown \"I've been feeling okay. \"    Pain Severity: no pain      What has been tried: metoprolol    LMP:  sometime in December, 2022  Pregnant:  \"not that I know of, but I haven't had my period in January. Recommended disposition: See HCP within 4 Hours (or PCP triage)    Care advice provided, patient verbalizes understanding; denies any other questions or concerns; instructed to call back for any new or worsening symptoms. Brianna German, Service Expert, is working to get an appt for pt. Attention Provider: Thank you for allowing me to participate in the care of your patient. The patient was connected to triage in response to information provided to the ECC/PSC. Please do not respond through this encounter as the response is not directed to a shared pool.       Reason for Disposition   [4] Systolic BP  >= 416 OR Diastolic >= 223 AND [0] having NO cardiac or neurologic symptoms    Protocols used: Blood Pressure - High-ADULT-AH

## 2023-02-07 ENCOUNTER — HOSPITAL ENCOUNTER (EMERGENCY)
Age: 38
Discharge: HOME | End: 2023-02-07
Attending: EMERGENCY MEDICINE
Payer: COMMERCIAL

## 2023-02-07 ENCOUNTER — APPOINTMENT (OUTPATIENT)
Dept: GENERAL RADIOLOGY | Age: 38
End: 2023-02-07
Payer: COMMERCIAL

## 2023-02-07 VITALS
DIASTOLIC BLOOD PRESSURE: 69 MMHG | HEART RATE: 83 BPM | RESPIRATION RATE: 19 BRPM | WEIGHT: 220 LBS | OXYGEN SATURATION: 100 % | SYSTOLIC BLOOD PRESSURE: 142 MMHG | BODY MASS INDEX: 38.98 KG/M2 | HEIGHT: 63 IN | TEMPERATURE: 98.4 F

## 2023-02-07 DIAGNOSIS — I10 ESSENTIAL HYPERTENSION: ICD-10-CM

## 2023-02-07 DIAGNOSIS — Z34.90 INTRAUTERINE PREGNANCY: ICD-10-CM

## 2023-02-07 DIAGNOSIS — I47.1 PAROXYSMAL SUPRAVENTRICULAR TACHYCARDIA (HCC): Primary | ICD-10-CM

## 2023-02-07 DIAGNOSIS — D64.9 ANEMIA, UNSPECIFIED TYPE: ICD-10-CM

## 2023-02-07 LAB
ALBUMIN SERPL-MCNC: 2.6 G/DL (ref 3.5–5)
ALBUMIN/GLOB SERPL: 0.7 (ref 0.4–1.6)
ALP SERPL-CCNC: 68 U/L (ref 50–136)
ALT SERPL-CCNC: 13 U/L (ref 12–65)
ANION GAP SERPL CALC-SCNC: 8 MMOL/L (ref 2–11)
APPEARANCE UR: ABNORMAL
APTT PPP: 32.9 SEC (ref 24.5–34.2)
AST SERPL-CCNC: 11 U/L (ref 15–37)
BACTERIA URNS QL MICRO: ABNORMAL /HPF
BASOPHILS # BLD: 0 K/UL (ref 0–0.2)
BASOPHILS NFR BLD: 0 % (ref 0–2)
BILIRUB SERPL-MCNC: 0.2 MG/DL (ref 0.2–1.1)
BILIRUB UR QL: NEGATIVE
BUN SERPL-MCNC: 31 MG/DL (ref 6–23)
CALCIUM SERPL-MCNC: 9 MG/DL (ref 8.3–10.4)
CASTS URNS QL MICRO: ABNORMAL /LPF
CHLORIDE SERPL-SCNC: 109 MMOL/L (ref 101–110)
CO2 SERPL-SCNC: 21 MMOL/L (ref 21–32)
COLOR UR: ABNORMAL
CREAT SERPL-MCNC: 1.2 MG/DL (ref 0.6–1)
CRYSTALS URNS QL MICRO: 0 /LPF
DIFFERENTIAL METHOD BLD: ABNORMAL
EOSINOPHIL # BLD: 0.1 K/UL (ref 0–0.8)
EOSINOPHIL NFR BLD: 1 % (ref 0.5–7.8)
EPI CELLS #/AREA URNS HPF: ABNORMAL /HPF
ERYTHROCYTE [DISTWIDTH] IN BLOOD BY AUTOMATED COUNT: 12.9 % (ref 11.9–14.6)
GLOBULIN SER CALC-MCNC: 3.6 G/DL (ref 2.8–4.5)
GLUCOSE SERPL-MCNC: 134 MG/DL (ref 65–100)
GLUCOSE UR STRIP.AUTO-MCNC: NEGATIVE MG/DL
HCG SERPL-ACNC: ABNORMAL MIU/ML (ref 0–6)
HCT VFR BLD AUTO: 22.8 % (ref 35.8–46.3)
HGB BLD-MCNC: 7.6 G/DL (ref 11.7–15.4)
HGB UR QL STRIP: ABNORMAL
IMM GRANULOCYTES # BLD AUTO: 0 K/UL (ref 0–0.5)
IMM GRANULOCYTES NFR BLD AUTO: 0 % (ref 0–5)
INR PPP: 1
KETONES UR QL STRIP.AUTO: ABNORMAL MG/DL
LEUKOCYTE ESTERASE UR QL STRIP.AUTO: NEGATIVE
LIPASE SERPL-CCNC: 101 U/L (ref 73–393)
LYMPHOCYTES # BLD: 1.9 K/UL (ref 0.5–4.6)
LYMPHOCYTES NFR BLD: 26 % (ref 13–44)
MCH RBC QN AUTO: 29.7 PG (ref 26.1–32.9)
MCHC RBC AUTO-ENTMCNC: 33.3 G/DL (ref 31.4–35)
MCV RBC AUTO: 89.1 FL (ref 82–102)
MONOCYTES # BLD: 0.6 K/UL (ref 0.1–1.3)
MONOCYTES NFR BLD: 8 % (ref 4–12)
MUCOUS THREADS URNS QL MICRO: 0 /LPF
NEUTS SEG # BLD: 4.8 K/UL (ref 1.7–8.2)
NEUTS SEG NFR BLD: 65 % (ref 43–78)
NITRITE UR QL STRIP.AUTO: NEGATIVE
NRBC # BLD: 0 K/UL (ref 0–0.2)
NT PRO BNP: 555 PG/ML (ref 5–125)
PH UR STRIP: 6.5 (ref 5–9)
PLATELET # BLD AUTO: 239 K/UL (ref 150–450)
PMV BLD AUTO: 9.9 FL (ref 9.4–12.3)
POTASSIUM SERPL-SCNC: 3.7 MMOL/L (ref 3.5–5.1)
PROT SERPL-MCNC: 6.2 G/DL (ref 6.3–8.2)
PROT UR STRIP-MCNC: 300 MG/DL
PROTHROMBIN TIME: 13.3 SEC (ref 12.6–14.3)
RBC # BLD AUTO: 2.56 M/UL (ref 4.05–5.2)
RBC #/AREA URNS HPF: ABNORMAL /HPF
SODIUM SERPL-SCNC: 138 MMOL/L (ref 133–143)
SP GR UR REFRACTOMETRY: 1.02 (ref 1–1.02)
TROPONIN I SERPL HS-MCNC: 13.6 PG/ML (ref 0–14)
TSH, 3RD GENERATION: 1.7 UIU/ML (ref 0.36–3.74)
URINE CULTURE IF INDICATED: ABNORMAL
UROBILINOGEN UR QL STRIP.AUTO: 1 EU/DL (ref 0.2–1)
WBC # BLD AUTO: 7.3 K/UL (ref 4.3–11.1)
WBC URNS QL MICRO: ABNORMAL /HPF

## 2023-02-07 PROCEDURE — 96361 HYDRATE IV INFUSION ADD-ON: CPT | Performed by: EMERGENCY MEDICINE

## 2023-02-07 PROCEDURE — 84702 CHORIONIC GONADOTROPIN TEST: CPT

## 2023-02-07 PROCEDURE — 87186 SC STD MICRODIL/AGAR DIL: CPT

## 2023-02-07 PROCEDURE — 96376 TX/PRO/DX INJ SAME DRUG ADON: CPT | Performed by: EMERGENCY MEDICINE

## 2023-02-07 PROCEDURE — 71045 X-RAY EXAM CHEST 1 VIEW: CPT

## 2023-02-07 PROCEDURE — 85025 COMPLETE CBC W/AUTO DIFF WBC: CPT

## 2023-02-07 PROCEDURE — 84484 ASSAY OF TROPONIN QUANT: CPT

## 2023-02-07 PROCEDURE — 81001 URINALYSIS AUTO W/SCOPE: CPT

## 2023-02-07 PROCEDURE — 96375 TX/PRO/DX INJ NEW DRUG ADDON: CPT | Performed by: EMERGENCY MEDICINE

## 2023-02-07 PROCEDURE — 6360000002 HC RX W HCPCS: Performed by: EMERGENCY MEDICINE

## 2023-02-07 PROCEDURE — 84443 ASSAY THYROID STIM HORMONE: CPT

## 2023-02-07 PROCEDURE — 87088 URINE BACTERIA CULTURE: CPT

## 2023-02-07 PROCEDURE — 83880 ASSAY OF NATRIURETIC PEPTIDE: CPT

## 2023-02-07 PROCEDURE — 83690 ASSAY OF LIPASE: CPT

## 2023-02-07 PROCEDURE — 2580000003 HC RX 258: Performed by: EMERGENCY MEDICINE

## 2023-02-07 PROCEDURE — 85610 PROTHROMBIN TIME: CPT

## 2023-02-07 PROCEDURE — 80053 COMPREHEN METABOLIC PANEL: CPT

## 2023-02-07 PROCEDURE — 85730 THROMBOPLASTIN TIME PARTIAL: CPT

## 2023-02-07 PROCEDURE — 2500000003 HC RX 250 WO HCPCS: Performed by: EMERGENCY MEDICINE

## 2023-02-07 PROCEDURE — 96374 THER/PROPH/DIAG INJ IV PUSH: CPT | Performed by: EMERGENCY MEDICINE

## 2023-02-07 PROCEDURE — 93005 ELECTROCARDIOGRAM TRACING: CPT | Performed by: EMERGENCY MEDICINE

## 2023-02-07 PROCEDURE — 99285 EMERGENCY DEPT VISIT HI MDM: CPT | Performed by: EMERGENCY MEDICINE

## 2023-02-07 PROCEDURE — 87086 URINE CULTURE/COLONY COUNT: CPT

## 2023-02-07 RX ORDER — METOPROLOL TARTRATE 5 MG/5ML
5 INJECTION INTRAVENOUS
Status: COMPLETED | OUTPATIENT
Start: 2023-02-07 | End: 2023-02-07

## 2023-02-07 RX ORDER — LABETALOL 100 MG/1
200 TABLET, FILM COATED ORAL 2 TIMES DAILY
Qty: 60 TABLET | Refills: 0 | Status: SHIPPED | OUTPATIENT
Start: 2023-02-07

## 2023-02-07 RX ORDER — 0.9 % SODIUM CHLORIDE 0.9 %
1000 INTRAVENOUS SOLUTION INTRAVENOUS
Status: COMPLETED | OUTPATIENT
Start: 2023-02-07 | End: 2023-02-07

## 2023-02-07 RX ORDER — METOPROLOL TARTRATE 5 MG/5ML
5 INJECTION INTRAVENOUS EVERY 6 HOURS
Status: DISCONTINUED | OUTPATIENT
Start: 2023-02-07 | End: 2023-02-07

## 2023-02-07 RX ORDER — ONDANSETRON 2 MG/ML
4 INJECTION INTRAMUSCULAR; INTRAVENOUS
Status: COMPLETED | OUTPATIENT
Start: 2023-02-07 | End: 2023-02-07

## 2023-02-07 RX ORDER — FERROUS SULFATE 325(65) MG
325 TABLET ORAL 2 TIMES DAILY
Qty: 60 TABLET | Refills: 0 | Status: SHIPPED | OUTPATIENT
Start: 2023-02-07

## 2023-02-07 RX ORDER — LABETALOL 100 MG/1
TABLET, FILM COATED ORAL
COMMUNITY
Start: 2023-01-31 | End: 2023-02-07 | Stop reason: SDUPTHER

## 2023-02-07 RX ORDER — ADENOSINE 3 MG/ML
6 INJECTION, SOLUTION INTRAVENOUS
Status: COMPLETED | OUTPATIENT
Start: 2023-02-07 | End: 2023-02-07

## 2023-02-07 RX ADMIN — METOPROLOL TARTRATE 5 MG: 5 INJECTION INTRAVENOUS at 20:20

## 2023-02-07 RX ADMIN — METOPROLOL TARTRATE 5 MG: 5 INJECTION, SOLUTION INTRAVENOUS at 19:37

## 2023-02-07 RX ADMIN — SODIUM CHLORIDE 1000 ML: 9 INJECTION, SOLUTION INTRAVENOUS at 19:37

## 2023-02-07 RX ADMIN — ONDANSETRON 4 MG: 2 INJECTION INTRAMUSCULAR; INTRAVENOUS at 19:37

## 2023-02-07 RX ADMIN — ADENOSINE 6 MG: 3 INJECTION INTRAVENOUS at 20:57

## 2023-02-07 ASSESSMENT — ENCOUNTER SYMPTOMS
NAUSEA: 0
COLOR CHANGE: 0
SHORTNESS OF BREATH: 1
BACK PAIN: 0
DIARRHEA: 0
COUGH: 0
RHINORRHEA: 0
SORE THROAT: 0
ABDOMINAL PAIN: 0
VOMITING: 0
CONSTIPATION: 0

## 2023-02-07 ASSESSMENT — PAIN SCALES - GENERAL: PAINLEVEL_OUTOF10: 0

## 2023-02-07 ASSESSMENT — PAIN - FUNCTIONAL ASSESSMENT: PAIN_FUNCTIONAL_ASSESSMENT: 0-10

## 2023-02-08 LAB
EKG ATRIAL RATE: 375 BPM
EKG DIAGNOSIS: NORMAL
EKG Q-T INTERVAL: 274 MS
EKG QRS DURATION: 64 MS
EKG QTC CALCULATION (BAZETT): 462 MS
EKG R AXIS: 28 DEGREES
EKG T AXIS: -17 DEGREES
EKG VENTRICULAR RATE: 171 BPM

## 2023-02-08 NOTE — ED PROVIDER NOTES
Emergency Department Provider Note                   PCP:                Kathy Villanueva DO               Age: 40 y.o. Sex: female       ICD-10-CM    1. Paroxysmal supraventricular tachycardia (HCC)  I47.1       2. Intrauterine pregnancy  Z34.90       3. Essential hypertension  I10       4. Anemia, unspecified type  D64.9           DISPOSITION Decision To Discharge 02/07/2023 10:43:39 PM       Medical Decision Making  40 y.o. patient  has a past medical history of HTN (hypertension) (11/4/2022). Patient with history of hypertension and SVT. Had a breakthrough SVT this evening. Converted with adenosine. Currently taking labetalol 100 mg twice a day. Spoke with both OB/GYN and cardiology and will increase patient's labetalol to 200 mg twice a day. We will start iron supplements for anemia. Will discharge with OB follow-up.         Ordered and Reviewed Labs: Labs Reviewed  CBC WITH AUTO DIFFERENTIAL - Abnormal; Notable for the following components:      RBC                           2.56 (*)               Hemoglobin                    7.6 (*)                Hematocrit                    22.8 (*)            All other components within normal limits  COMPREHENSIVE METABOLIC PANEL - Abnormal; Notable for the following components:     Glucose                       134 (*)                BUN                           31 (*)                 Creatinine                    1.20 (*)               AST                           11 (*)                 Total Protein                 6.2 (*)                Albumin                       2.6 (*)             All other components within normal limits  URINALYSIS WITH REFLEX TO CULTURE - Abnormal; Notable for the following components:     Protein, UA                   300 (*)                Ketones, Urine                TRACE (*)               Blood, Urine                  LARGE (*)            All other components within normal limits  HCG, QUANTITATIVE, PREGNANCY - Abnormal; Notable for the following components:     hCG Quant                     78,063 (*)            All other components within normal limits  BRAIN NATRIURETIC PEPTIDE - Abnormal; Notable for the following components:     NT Pro-BNP                    555 (*)             All other components within normal limits  TROPONIN  PROTIME-INR  APTT  LIPASE  TSH    Imaging Independent Interpretation: XR CHEST PORTABLE   Final Result        No acute       EKG Independent Interpretation: EKG: nonspecific ST and T waves changes, SVT. External Note Reviewed: Prior admission     Consideration regarding hospitalization: Considered hospitalization until patient converted to normal sinus rhythm. Reassess: Patient doing well feeling better. Consults: Cardiology and OB. Disposition: Discharge      Reviewed current meds and recommend we will increase patient's labetalol from 100 to 200 mg. Final DX: Acute SVT. Anemia. IUP. Hypertension      Amount and/or Complexity of Data Reviewed  Labs: ordered. Radiology: ordered. ECG/medicine tests: ordered. Risk  OTC drugs. Prescription drug management.                                 Orders Placed This Encounter   Procedures    XR CHEST PORTABLE    Troponin    CBC with Auto Differential    Comprehensive Metabolic Panel    Protime-INR    APTT    Urinalysis with Reflex to Culture    Lipase    TSH    HCG, Quantitative, Pregnancy    Brain Natriuretic Peptide    EKG 12 Lead    EKG 12 Lead    EKG 12 Lead        Medications   0.9 % sodium chloride bolus (0 mLs IntraVENous Stopped 2/7/23 2118)   ondansetron (ZOFRAN) injection 4 mg (4 mg IntraVENous Given 2/7/23 1937)   metoprolol (LOPRESSOR) injection 5 mg (5 mg IntraVENous Given 2/7/23 1937)   metoprolol (LOPRESSOR) injection 5 mg (5 mg IntraVENous Given 2/7/23 2020)   adenosine (ADENOCARD) injection 6 mg (6 mg IntraVENous Given 2/7/23 2057)       New Prescriptions    FERROUS SULFATE (IRON 325) 325 (65 FE) MG TABLET    Take 1 tablet by mouth 2 times daily        Whit Ruiz is a 40 y.o. female who presents to the Emergency Department with chief complaint of    Chief Complaint   Patient presents with    Tachycardia    Emesis During Pregnancy      Patient with a history of SVT diagnosed in November along with some hypertension. Was admitted to the hospital and seen by cardiology. Was started on metoprolol. She is now approximately 4 weeks pregnant and was seen at an urgent care recently with elevated blood pressure in the 334Z systolic. She was taken off of the metoprolol and switch to labetalol for blood pressure control. She presents this evening after an hour of palpitations and racing heart. Has some shortness of breath. No chest pain. While here in the ER developed some nausea and vomiting. Has some mild swelling in the legs. The history is provided by the patient. No  was used. Tachycardia  This is a new problem. The current episode started less than 1 hour ago. The problem occurs constantly. The problem has not changed since onset. Associated symptoms include shortness of breath. Pertinent negatives include no chest pain, no abdominal pain and no headaches. Nothing aggravates the symptoms. Nothing relieves the symptoms. She has tried nothing for the symptoms. Review of Systems   Constitutional:  Negative for chills and fever. HENT:  Negative for rhinorrhea and sore throat. Respiratory:  Positive for shortness of breath. Negative for cough. Cardiovascular:  Positive for leg swelling. Negative for chest pain and palpitations. Gastrointestinal:  Negative for abdominal pain, constipation, diarrhea, nausea and vomiting. Genitourinary:  Negative for dysuria and hematuria. Musculoskeletal:  Negative for back pain and neck pain. Skin:  Negative for color change and rash. Neurological:  Negative for weakness, numbness and headaches. All other systems reviewed and are negative.     Past Medical History:   Diagnosis Date    HTN (hypertension) 11/4/2022        History reviewed. No pertinent surgical history. History reviewed. No pertinent family history. Social History     Socioeconomic History    Marital status: Single     Spouse name: None    Number of children: None    Years of education: None    Highest education level: None   Tobacco Use    Smoking status: Every Day     Packs/day: 1.00     Types: Cigarettes    Smokeless tobacco: Never   Substance and Sexual Activity    Alcohol use: Yes    Drug use: No        Allergies: Nickel    Previous Medications    ALBUTEROL SULFATE  (90 BASE) MCG/ACT INHALER    Inhale 2 puffs into the lungs every 6 hours as needed    CHLORHEXIDINE (HIBICLENS) 4 % EXTERNAL LIQUID    Wash once daily, avoiding genitals and eyes and allow to stay on for 30 seconds then wash off. 1 week duration. METFORMIN (GLUCOPHAGE) 500 MG TABLET    Take 1 tablet by mouth 2 times daily (with meals)    MUPIROCIN (BACTROBAN) 2 % OINTMENT    Apply to the inside of both nostrils twice daily for 7 days. TRAMADOL (ULTRAM) 50 MG TABLET    Take 50 mg by mouth every 6 hours as needed. Vitals signs and nursing note reviewed. Patient Vitals for the past 4 hrs:   Temp Pulse Resp BP SpO2   02/07/23 2230 -- 86 17 (!) 158/88 --   02/07/23 2115 -- 95 28 133/82 --   02/07/23 2100 -- 85 11 (!) 162/90 --   02/07/23 2059 -- 88 13 -- --   02/07/23 2045 -- (!) 141 11 128/74 --   02/07/23 2036 -- (!) 141 (!) 31 137/87 --   02/07/23 2015 -- (!) 145 17 115/73 --   02/07/23 1943 -- (!) 149 21 118/85 --   02/07/23 1923 -- -- 20 -- --   02/07/23 1912 98.4 °F (36.9 °C) (!) 171 -- 108/74 100 %          Physical Exam  Vitals and nursing note reviewed. Constitutional:       Appearance: Normal appearance. HENT:      Head: Normocephalic and atraumatic. Cardiovascular:      Rate and Rhythm: Regular rhythm. Tachycardia present.    Pulmonary:      Effort: Respiratory distress (mild increased effort) present. Breath sounds: Normal breath sounds. No wheezing. Abdominal:      General: Bowel sounds are normal.      Palpations: Abdomen is soft. Tenderness: There is no abdominal tenderness. Genitourinary:     Rectum: Guaiac result negative. Musculoskeletal:         General: Swelling (mild BLE) present. Normal range of motion. Cervical back: Normal range of motion. No tenderness. Skin:     General: Skin is warm and dry. Neurological:      Mental Status: She is alert. Critical Care  Performed by: Candelario Hernández MD  Authorized by: Candelario Hernández MD     Critical care provider statement:     Critical care time (minutes):  55    Critical care time was exclusive of:  Separately billable procedures and treating other patients    Critical care was necessary to treat or prevent imminent or life-threatening deterioration of the following conditions:  Cardiac failure and circulatory failure    Critical care was time spent personally by me on the following activities:  Development of treatment plan with patient or surrogate, discussions with consultants, evaluation of patient's response to treatment, examination of patient, interpretation of cardiac output measurements, obtaining history from patient or surrogate, ordering and performing treatments and interventions, ordering and review of laboratory studies, ordering and review of radiographic studies, pulse oximetry, re-evaluation of patient's condition and review of old charts    I assumed direction of critical care for this patient from another provider in my specialty: no      ED EKG Interpretation  EKG was interpreted in the absence of a cardiologist.    Rate: 171  EKG Interpretation: EKG Interpretation: supraventricular tachycardia  ST Segments: Nonspecific ST segments - NO STEMI      EKG: nonspecific ST and T waves changes, SVT. Rate 142    Post Adenosine. EKG: normal sinus rhythm, nonspecific ST and T waves changes.  Rate 95.    EARLY PREGNANCY ULTRASOUND:  A limited, bedside pelvic ultrasound was performed using a transabdominal probe. The medical necessity was to evaluate for signs of an intrauterine versus ectopic pregnancy. The structures studied were the uterus and its contents, bladder, vesicouterine space, and rectouterine space. IMPRESSION:  Evidence for an IUP was seen. Fetal Movement was not seen. Free fluid was not seen. The study was technically adequate. Results for orders placed or performed during the hospital encounter of 02/07/23   XR CHEST PORTABLE    Narrative    EXAMINATION: Single view chest    DATE: 2/7/2023 7:36 PM    COMPARISON: None    CLINICAL HISTORY: Shortness of breath    FINDINGS:    Costophrenic angles are clear. Heart size is normal.  Pulmonary vasculature is not distended. There are no dense regions of   consolidation. Impression    No acute cardiopulmonary process. Scar Kenney M.D.   2/7/2023 7:59:00 PM   Troponin   Result Value Ref Range    Troponin, High Sensitivity 13.6 0 - 14 pg/mL   CBC with Auto Differential   Result Value Ref Range    WBC 7.3 4.3 - 11.1 K/uL    RBC 2.56 (L) 4.05 - 5.2 M/uL    Hemoglobin 7.6 (L) 11.7 - 15.4 g/dL    Hematocrit 22.8 (L) 35.8 - 46.3 %    MCV 89.1 82 - 102 FL    MCH 29.7 26.1 - 32.9 PG    MCHC 33.3 31.4 - 35.0 g/dL    RDW 12.9 11.9 - 14.6 %    Platelets 770 479 - 475 K/uL    MPV 9.9 9.4 - 12.3 FL    nRBC 0.00 0.0 - 0.2 K/uL    Differential Type AUTOMATED      Seg Neutrophils 65 43 - 78 %    Lymphocytes 26 13 - 44 %    Monocytes 8 4.0 - 12.0 %    Eosinophils % 1 0.5 - 7.8 %    Basophils 0 0.0 - 2.0 %    Immature Granulocytes 0 0.0 - 5.0 %    Segs Absolute 4.8 1.7 - 8.2 K/UL    Absolute Lymph # 1.9 0.5 - 4.6 K/UL    Absolute Mono # 0.6 0.1 - 1.3 K/UL    Absolute Eos # 0.1 0.0 - 0.8 K/UL    Basophils Absolute 0.0 0.0 - 0.2 K/UL    Absolute Immature Granulocyte 0.0 0.0 - 0.5 K/UL   Comprehensive Metabolic Panel   Result Value Ref Range    Sodium 138 133 - 143 mmol/L    Potassium 3.7 3.5 - 5.1 mmol/L    Chloride 109 101 - 110 mmol/L    CO2 21 21 - 32 mmol/L    Anion Gap 8 2 - 11 mmol/L    Glucose 134 (H) 65 - 100 mg/dL    BUN 31 (H) 6 - 23 MG/DL    Creatinine 1.20 (H) 0.6 - 1.0 MG/DL    Est, Glom Filt Rate >60 >60 ml/min/1.73m2    Calcium 9.0 8.3 - 10.4 MG/DL    Total Bilirubin 0.2 0.2 - 1.1 MG/DL    ALT 13 12 - 65 U/L    AST 11 (L) 15 - 37 U/L    Alk Phosphatase 68 50 - 136 U/L    Total Protein 6.2 (L) 6.3 - 8.2 g/dL    Albumin 2.6 (L) 3.5 - 5.0 g/dL    Globulin 3.6 2.8 - 4.5 g/dL    Albumin/Globulin Ratio 0.7 0.4 - 1.6     Protime-INR   Result Value Ref Range    Protime 13.3 12.6 - 14.3 sec    INR 1.0     APTT   Result Value Ref Range    PTT 32.9 24.5 - 34.2 SEC   Urinalysis with Reflex to Culture    Specimen: Urine   Result Value Ref Range    Color, UA YELLOW/STRAW      Appearance TURBID      Specific Gravity, UA 1.018 1.001 - 1.023      pH, Urine 6.5 5.0 - 9.0      Protein,  (A) NEG mg/dL    Glucose, UA Negative mg/dL    Ketones, Urine TRACE (A) NEG mg/dL    Bilirubin Urine Negative NEG      Blood, Urine LARGE (A) NEG      Urobilinogen, Urine 1.0 0.2 - 1.0 EU/dL    Nitrite, Urine Negative NEG      Leukocyte Esterase, Urine Negative NEG      WBC, UA PENDING /hpf    RBC, UA PENDING /hpf    BACTERIA, URINE PENDING /hpf    Urine Culture if Indicated PENDING    Lipase   Result Value Ref Range    Lipase 101 73 - 393 U/L   TSH   Result Value Ref Range    TSH, 3RD GENERATION 1.700 0.358 - 3.740 uIU/mL   HCG, Quantitative, Pregnancy   Result Value Ref Range    hCG Quant 78,063 (H) 0.0 - 6.0 MIU/ML   Brain Natriuretic Peptide   Result Value Ref Range    NT Pro- (H) 5 - 125 PG/ML   EKG 12 Lead   Result Value Ref Range    Ventricular Rate 171 BPM    Atrial Rate 375 BPM    QRS Duration 64 ms    Q-T Interval 274 ms    QTc Calculation (Bazett) 462 ms    R Axis 28 degrees    T Axis -17 degrees    Diagnosis Atrial flutter         XR CHEST PORTABLE   Final Result      No acute cardiopulmonary process. Scar Moreira M.D.    2/7/2023 7:59:00 PM                            Voice dictation software was used during the making of this note. This software is not perfect and grammatical and other typographical errors may be present. This note has not been completely proofread for errors.      Zoe Cunningham III, MD  02/07/23 8550 Basilia Espinal MD  02/09/23 [id number]

## 2023-02-08 NOTE — ED TRIAGE NOTES
Pt states she is pregnant, [facility] 12/8, reports that she began feeling like her heart was racing around 18:30, began vomiting in triage.  in triage. Hx of SVT, denies any CP, + dizziness and fatigue. Stopped taking metoprolol last Tuesday switched to labetalol.

## 2023-02-08 NOTE — ED NOTES
I have reviewed discharge instructions with the patient. The patient verbalized understanding. Patient left ED via Discharge Method: ambulatory to Home with self    Opportunity for questions and clarification provided. Patient given 2 scripts. To continue your aftercare when you leave the hospital, you may receive an automated call from our care team to check in on how you are doing. This is a free service and part of our promise to provide the best care and service to meet your aftercare needs.  If you have questions, or wish to unsubscribe from this service please call [phone]. Thank you for Choosing our [facility] Emergency Department.         Omkar Cardoso RN  02/07/23 8302

## 2023-02-10 LAB
BACTERIA SPEC CULT: ABNORMAL
BACTERIA SPEC CULT: ABNORMAL
SERVICE CMNT-IMP: ABNORMAL

## 2023-02-15 ENCOUNTER — INITIAL CONSULT (OUTPATIENT)
Dept: CARDIOLOGY CLINIC | Age: 38
End: 2023-02-15
Payer: COMMERCIAL

## 2023-02-15 VITALS
HEIGHT: 62 IN | BODY MASS INDEX: 39.36 KG/M2 | HEART RATE: 83 BPM | SYSTOLIC BLOOD PRESSURE: 190 MMHG | DIASTOLIC BLOOD PRESSURE: 100 MMHG | WEIGHT: 213.9 LBS

## 2023-02-15 DIAGNOSIS — I47.1 SVT (SUPRAVENTRICULAR TACHYCARDIA) (HCC): Primary | ICD-10-CM

## 2023-02-15 DIAGNOSIS — I10 PRIMARY HYPERTENSION: ICD-10-CM

## 2023-02-15 PROCEDURE — G8417 CALC BMI ABV UP PARAM F/U: HCPCS | Performed by: INTERNAL MEDICINE

## 2023-02-15 PROCEDURE — 3080F DIAST BP >= 90 MM HG: CPT | Performed by: INTERNAL MEDICINE

## 2023-02-15 PROCEDURE — G8484 FLU IMMUNIZE NO ADMIN: HCPCS | Performed by: INTERNAL MEDICINE

## 2023-02-15 PROCEDURE — 93000 ELECTROCARDIOGRAM COMPLETE: CPT | Performed by: INTERNAL MEDICINE

## 2023-02-15 PROCEDURE — 4004F PT TOBACCO SCREEN RCVD TLK: CPT | Performed by: INTERNAL MEDICINE

## 2023-02-15 PROCEDURE — 99214 OFFICE O/P EST MOD 30 MIN: CPT | Performed by: INTERNAL MEDICINE

## 2023-02-15 PROCEDURE — 3077F SYST BP >= 140 MM HG: CPT | Performed by: INTERNAL MEDICINE

## 2023-02-15 PROCEDURE — G8427 DOCREV CUR MEDS BY ELIG CLIN: HCPCS | Performed by: INTERNAL MEDICINE

## 2023-02-15 NOTE — PROGRESS NOTES
[address] [facility], [address] [place] [address]  PHONE: [phone]  1985    SUBJECTIVE:   Christina Sullivan is a 40 y.o. female seen for a follow up visit regarding the following:     Chief Complaint   Patient presents with    Consultation    Irregular Heart Beat         HPI:    Christina Sullivan is a very pleasant 40 y.o. female with a past medical and cardiac history significant for DM, anemia, SVT and presents for follow up of SVT. She has had several presentations to hospital with SVT that terminated with adenosine. She also recently learned she is pregnant. Cardiac PMH: (Old records have been reviewed and summarized below)  TTE (11/4/22): EF 55-60%    Reviewed ER note Dr. Annie Carr 2/9/23    Past Medical History, Past Surgical History, Family history, Social History, and Medications were all reviewed with the patient today and updated as necessary. Current Outpatient Medications   Medication Sig Dispense Refill    Prenatal Vit-Fe Fumarate-FA (PRENATAL VITAMIN AND MINERAL PO) Take by mouth      labetalol (NORMODYNE) 100 MG tablet Take 2 tablets by mouth 2 times daily 60 tablet 0    ferrous sulfate (IRON 325) 325 (65 Fe) MG tablet Take 1 tablet by mouth 2 times daily 60 tablet 0    metFORMIN (GLUCOPHAGE) 500 MG tablet Take 1 tablet by mouth 2 times daily (with meals) 60 tablet 3    albuterol sulfate  (90 Base) MCG/ACT inhaler Inhale 2 puffs into the lungs every 6 hours as needed (Patient not taking: Reported on 2/15/2023)       No current facility-administered medications for this visit. Allergies   Allergen Reactions    Nickel Rash     [unfilled]  Past Medical History:   Diagnosis Date    HTN (hypertension) 11/4/2022     History reviewed. No pertinent surgical history. History reviewed. No pertinent family history.   Social History     Tobacco Use    Smoking status: Some Days     Packs/day: 1.00     Types: Cigarettes    Smokeless tobacco: Never    Tobacco comments:     Has cut down to 1-2 cigs from 15 cigs per day. Goal is none ASAP. Substance Use Topics    Alcohol use: Yes       PHYSICAL EXAM:    BP (!) 190/100 Comment: right arm  Pulse 83   Ht 5' 2\" (1.575 m)   Wt 213 lb 14.4 oz (97 kg)   BMI 39.12 kg/m²      Wt Readings from Last 5 Encounters:   02/15/23 213 lb 14.4 oz (97 kg)   02/07/23 220 lb (99.8 kg)   11/05/22 206 lb 9.6 oz (93.7 kg)       BP Readings from Last 5 Encounters:   02/15/23 (!) 190/100   02/07/23 (!) 142/69   11/05/22 (!) 153/90       General appearance: Alert, well appearing, and in no distress   CV: RRR, no M/R/G, no JVD, normal distal pulses, no lower extremity edema  Pulm: Clear to auscultation, no wheezes, no crackles, no accessory muscle use  GI: Soft, nontender, nondistended  Neuro: Alert and oriented    Medical problems and test results were reviewed with the patient today. Lab Results   Component Value Date/Time    K 3.7 02/07/2023 07:31 PM     Creatinine   Date Value Ref Range Status   02/07/2023 1.20 (H) 0.6 - 1.0 MG/DL Final     Lab Results   Component Value Date/Time    HGB 7.6 02/07/2023 07:31 PM     Lab Results   Component Value Date/Time    INR 1.0 02/07/2023 07:31 PM       Lab Results   Component Value Date    WBC 7.3 02/07/2023    HGB 7.6 (L) 02/07/2023    HCT 22.8 (L) 02/07/2023    MCV 89.1 02/07/2023     02/07/2023      Lab Results   Component Value Date/Time     02/07/2023 07:31 PM    K 3.7 02/07/2023 07:31 PM     02/07/2023 07:31 PM    CO2 21 02/07/2023 07:31 PM    BUN 31 02/07/2023 07:31 PM    CREATININE 1.20 02/07/2023 07:31 PM    GLUCOSE 134 02/07/2023 07:31 PM    CALCIUM 9.0 02/07/2023 07:31 PM         EKG: (EKG has been independently visualized by me with interpretation below)  Sinus  Rhythm, rate 83  Low voltage in precordial leads. Marge Latham was seen today for consultation and irregular heart beat.     Diagnoses and all orders for this visit:    SVT (supraventricular tachycardia) ([address]  - EKG 12 lead    Primary hypertension      ASSESSMENT and PLAN  1. SVT: I had a discussion with the Pt today regarding the diagnosis of supraventricular tachycardia and treatment options including AV chelita blocking drugs, antiarrhythmic therapy, catheter ablation and/or the combination of the above. I discussed at length the advantages and disadvantages of all treatment strategies. We discussed the option of cardiac ablation in detail, including discussion of some of the more common, however, very low risks of the procedure including access complications and risks of damage to the heart or surrounding structures. A more detailed discussion of possible complications from cardiac ablation will be addressed again during the consent process the day of the procedure. The patient will also meet with the staff anesthesiologist the day of the procedure to discuss some of the same possible risks, as well as other risks, specific to the patient undergoing anesthesia. We also discussed some of the limitations of an electrophysiology study including non-inducibility that would limit ablation options. --would recommend EPS and SVT ablation       --Pt is currently pregnant, would plan to pursue after delivery       --cont labetalol at the discretion of OB    2. HTN: recommend getting BP cuff to check at home, has OB appt coming up in 1 week    Patient has been instructed and agrees to call our office with any issues or other concerns related to their cardiac condition(s) and/or complaint(s). Return in about 6 months (around 8/15/2023). Diane Henry MD, MS  Cardiology/Electrophysiology  2/15/2023  10:46 AM